# Patient Record
Sex: MALE | Race: WHITE | NOT HISPANIC OR LATINO | Employment: OTHER | URBAN - METROPOLITAN AREA
[De-identification: names, ages, dates, MRNs, and addresses within clinical notes are randomized per-mention and may not be internally consistent; named-entity substitution may affect disease eponyms.]

---

## 2017-02-16 DIAGNOSIS — I10 BENIGN ESSENTIAL HYPERTENSION: ICD-10-CM

## 2017-02-16 RX ORDER — HYDROCHLOROTHIAZIDE 25 MG/1
TABLET ORAL
Qty: 90 TABLET | Refills: 0 | OUTPATIENT
Start: 2017-02-16

## 2017-02-17 DIAGNOSIS — I10 BENIGN ESSENTIAL HYPERTENSION: ICD-10-CM

## 2017-02-17 RX ORDER — HYDROCHLOROTHIAZIDE 25 MG/1
TABLET ORAL
Qty: 90 TABLET | Refills: 0 | Status: SHIPPED | OUTPATIENT
Start: 2017-02-17 | End: 2017-05-20

## 2017-02-17 NOTE — TELEPHONE ENCOUNTER
hydrochlorothiazide (HYDRODIURIL) 25 MG tablet        Last Written Prescription Date: 6/6/2016  Last Fill Quantity: 90, # refills: 3  Last Office Visit with [facility], [facility] or [facility] prescribing provider: 6/6/2016       Potassium   Date Value Ref Range Status   07/07/2016 3.4 3.4 - 5.3 mmol/L Final     Creatinine   Date Value Ref Range Status   07/07/2016 1.25 0.66 - 1.25 mg/dL Final     BP Readings from Last 3 Encounters:   06/06/16 138/86   03/20/15 134/84   05/31/13 130/71

## 2017-02-17 NOTE — TELEPHONE ENCOUNTER
Prescription approved per [facility].  Jenny Acosta RN

## 2017-04-24 ENCOUNTER — TELEPHONE (OUTPATIENT)
Dept: FAMILY MEDICINE | Facility: CLINIC | Age: 52
End: 2017-04-24

## 2017-04-24 DIAGNOSIS — R10.9 FLANK PAIN: Primary | ICD-10-CM

## 2017-04-25 NOTE — TELEPHONE ENCOUNTER
Patient called re flank pain, off and on for 3 weeks, no f,c,s.  No abdomen pain or n/v, normal bm and urine.  Prior renal stones.    Will get ct and go from there    Hossein Farah M.D.

## 2017-04-27 ENCOUNTER — HOSPITAL ENCOUNTER (OUTPATIENT)
Dept: CT IMAGING | Facility: CLINIC | Age: 52
Discharge: HOME | End: 2017-04-27
Attending: INTERNAL MEDICINE | Admitting: INTERNAL MEDICINE
Payer: COMMERCIAL

## 2017-04-27 DIAGNOSIS — R10.9 FLANK PAIN: ICD-10-CM

## 2017-04-27 PROCEDURE — 74176 CT ABD & PELVIS W/O CONTRAST: CPT

## 2017-04-27 NOTE — LETTER
[facility]  [address] [facility]  [address]  [place]  [address]  Tel: [phone]    April 28, 2017    Allen Bills  [address] [place] [facility] [address]        Dear Loy Sanju,    The results of your recent CT is enclosed.    If you have any further questions or problems, please contact our office.      Sincerely,    Hossein Farah MD/maureen    Results for orders placed or performed during the hospital encounter of 04/27/17   CT Abdomen Pelvis w/o Contrast    Narrative    CT ABDOMEN AND PELVIS WITHOUT CONTRAST 4/27/2017 11:43 AM     HISTORY: Unspecified abdominal pain.    COMPARISON: None.    TECHNIQUE: Axial CT images of the abdomen and pelvis from the  diaphragm to the symphysis pubis were acquired without IV contrast.  Radiation dose for this scan was reduced using automated exposure  control, adjustment of the mA and/or kV according to patient size, or  iterative reconstruction technique.    FINDINGS: There are no stones seen within either kidney, either  ureter, or the bladder. There is no hydroureter or hydronephrosis.  There is no perinephric fat stranding. Kidneys are normal in size and  configuration. Normal caliber aorta. Unremarkable gallbladder.  Visualized portions of the appendix unremarkable. Liver unremarkable.  There are no dilated loops of small intestine or large bowel to  suggest ileus or obstruction. No free air or free fluid. No  splenomegaly. No definite adrenal nodules. Pancreas grossly  unremarkable. The remainder of the visualized abdomen is unremarkable  on this noncontrast scan. Survey of the visualized bony structures  demonstrates no destructive bony lesions. The visualized lung bases  are unremarkable.       Impression    IMPRESSION: No acute process demonstrated in the abdomen and pelvis.    FIDEL DE DIOS MD           Enclosure: Lab Results

## 2017-04-28 ENCOUNTER — TELEPHONE (OUTPATIENT)
Dept: FAMILY MEDICINE | Facility: CLINIC | Age: 52
End: 2017-04-28

## 2017-05-20 DIAGNOSIS — I10 BENIGN ESSENTIAL HYPERTENSION: ICD-10-CM

## 2017-05-20 NOTE — TELEPHONE ENCOUNTER
Pending Prescriptions:                       Disp   Refills    hydrochlorothiazide (HYDRODIURIL) 25 MG t*90 tab*0            Sig: TAKE ONE TABLET BY MOUTH ONE TIME DAILY    amLODIPine (NORVASC) 5 MG tablet [Pharmac*90 tab*3            Sig: TAKE 1 TABLET BY MOUTH DAILY    HCTZ      Last Written Prescription Date: 2/17/17  Last Fill Quantity: 90, # refills: 0  Last Office Visit with [facility] or [facility] prescribing provider: 6/6/16 [facility]       Potassium   Date Value Ref Range Status   07/07/2016 3.4 3.4 - 5.3 mmol/L Final     Creatinine   Date Value Ref Range Status   07/07/2016 1.25 0.66 - 1.25 mg/dL Final     BP Readings from Last 3 Encounters:   06/06/16 138/86   03/20/15 134/84   05/31/13 130/71     Amlodipine      Last Written Prescription Date: 6/6/16  Last Fill Quantity: 90, # refills: 3    Last Office Visit with [facility] or [facility] prescribing provider:  6/6/16 [facility]   Future Office Visit:        BP Readings from Last 3 Encounters:   06/06/16 138/86   03/20/15 134/84   05/31/13 130/71     Aury Paul RT(R)

## 2017-05-22 RX ORDER — HYDROCHLOROTHIAZIDE 25 MG/1
25 TABLET ORAL DAILY
Qty: 30 TABLET | Refills: 0 | Status: SHIPPED | OUTPATIENT
Start: 2017-05-22 | End: 2017-06-10

## 2017-05-22 RX ORDER — AMLODIPINE BESYLATE 5 MG/1
5 TABLET ORAL DAILY
Qty: 30 TABLET | Refills: 0 | Status: SHIPPED | OUTPATIENT
Start: 2017-05-22 | End: 2017-06-10

## 2017-05-22 NOTE — TELEPHONE ENCOUNTER
Prescription approved per [facility] Refill Protocol for 30 day supply  Patient will be due for physical come June 2016.    Sivan Mcdowell RN

## 2017-06-28 DIAGNOSIS — R56.9 CONVULSIONS, UNSPECIFIED CONVULSION TYPE (H): ICD-10-CM

## 2017-06-29 RX ORDER — LEVETIRACETAM 500 MG/1
TABLET ORAL
Qty: 180 TABLET | Refills: 0 | Status: SHIPPED | OUTPATIENT
Start: 2017-06-29 | End: 2017-08-01

## 2017-08-01 DIAGNOSIS — R56.9 CONVULSIONS, UNSPECIFIED CONVULSION TYPE (H): ICD-10-CM

## 2017-08-02 RX ORDER — LEVETIRACETAM 500 MG/1
TABLET ORAL
Qty: 180 TABLET | Refills: 0 | Status: SHIPPED | OUTPATIENT
Start: 2017-08-02 | End: 2017-08-15

## 2017-08-02 NOTE — TELEPHONE ENCOUNTER
levETIRAcetam (KEPPRA) 500 MG tablet        Last Written Prescription Date: 6/29/2017  Last Fill Quantity: 180,  # refills: 0   Last Office Visit with [facility], [facility] or [facility] prescribing provider: 6/6/2016                                         Next 5 appointments (look out 90 days)     Aug 15, 2017  3:30 PM CDT   Office Visit with Hossein Farah MD   [facility]    [address] [facility] [address]   [phone]

## 2017-08-15 ENCOUNTER — OFFICE VISIT (OUTPATIENT)
Dept: FAMILY MEDICINE | Facility: CLINIC | Age: 52
End: 2017-08-15
Payer: COMMERCIAL

## 2017-08-15 VITALS
SYSTOLIC BLOOD PRESSURE: 122 MMHG | OXYGEN SATURATION: 99 % | DIASTOLIC BLOOD PRESSURE: 70 MMHG | HEIGHT: 66 IN | BODY MASS INDEX: 32.3 KG/M2 | TEMPERATURE: 98.1 F | WEIGHT: 201 LBS | HEART RATE: 80 BPM

## 2017-08-15 DIAGNOSIS — Z00.00 ROUTINE GENERAL MEDICAL EXAMINATION AT A HEALTH CARE FACILITY: Primary | ICD-10-CM

## 2017-08-15 DIAGNOSIS — R56.9 CONVULSIONS, UNSPECIFIED CONVULSION TYPE (H): ICD-10-CM

## 2017-08-15 DIAGNOSIS — I10 BENIGN ESSENTIAL HYPERTENSION: ICD-10-CM

## 2017-08-15 DIAGNOSIS — R06.09 DOE (DYSPNEA ON EXERTION): ICD-10-CM

## 2017-08-15 DIAGNOSIS — R79.89 ELEVATED SERUM CREATININE: ICD-10-CM

## 2017-08-15 DIAGNOSIS — E78.5 HYPERLIPIDEMIA LDL GOAL <100: ICD-10-CM

## 2017-08-15 LAB
ALBUMIN UR-MCNC: NEGATIVE MG/DL
APPEARANCE UR: CLEAR
BASOPHILS # BLD AUTO: 0 10E9/L (ref 0–0.2)
BASOPHILS NFR BLD AUTO: 0.4 %
BILIRUB UR QL STRIP: NEGATIVE
COLOR UR AUTO: YELLOW
DIFFERENTIAL METHOD BLD: NORMAL
EOSINOPHIL # BLD AUTO: 0.1 10E9/L (ref 0–0.7)
EOSINOPHIL NFR BLD AUTO: 1.2 %
ERYTHROCYTE [DISTWIDTH] IN BLOOD BY AUTOMATED COUNT: 12.7 % (ref 10–15)
GLUCOSE UR STRIP-MCNC: NEGATIVE MG/DL
HCT VFR BLD AUTO: 43.1 % (ref 40–53)
HGB BLD-MCNC: 14.6 G/DL (ref 13.3–17.7)
HGB UR QL STRIP: ABNORMAL
KETONES UR STRIP-MCNC: NEGATIVE MG/DL
LEUKOCYTE ESTERASE UR QL STRIP: NEGATIVE
LYMPHOCYTES # BLD AUTO: 2.2 10E9/L (ref 0.8–5.3)
LYMPHOCYTES NFR BLD AUTO: 28.7 %
MCH RBC QN AUTO: 31.3 PG (ref 26.5–33)
MCHC RBC AUTO-ENTMCNC: 33.9 G/DL (ref 31.5–36.5)
MCV RBC AUTO: 93 FL (ref 78–100)
MONOCYTES # BLD AUTO: 0.4 10E9/L (ref 0–1.3)
MONOCYTES NFR BLD AUTO: 5.5 %
NEUTROPHILS # BLD AUTO: 4.8 10E9/L (ref 1.6–8.3)
NEUTROPHILS NFR BLD AUTO: 64.2 %
NITRATE UR QL: NEGATIVE
PH UR STRIP: 6 PH (ref 5–7)
PLATELET # BLD AUTO: 273 10E9/L (ref 150–450)
RBC # BLD AUTO: 4.66 10E12/L (ref 4.4–5.9)
RBC #/AREA URNS AUTO: NORMAL /HPF
SOURCE: ABNORMAL
SP GR UR STRIP: 1.02 (ref 1–1.03)
UROBILINOGEN UR STRIP-ACNC: 0.2 EU/DL (ref 0.2–1)
WBC # BLD AUTO: 7.5 10E9/L (ref 4–11)
WBC #/AREA URNS AUTO: NORMAL /HPF

## 2017-08-15 PROCEDURE — 99396 PREV VISIT EST AGE 40-64: CPT | Performed by: INTERNAL MEDICINE

## 2017-08-15 PROCEDURE — G0103 PSA SCREENING: HCPCS | Performed by: INTERNAL MEDICINE

## 2017-08-15 PROCEDURE — 81001 URINALYSIS AUTO W/SCOPE: CPT | Performed by: INTERNAL MEDICINE

## 2017-08-15 PROCEDURE — 80061 LIPID PANEL: CPT | Performed by: INTERNAL MEDICINE

## 2017-08-15 PROCEDURE — 85025 COMPLETE CBC W/AUTO DIFF WBC: CPT | Performed by: INTERNAL MEDICINE

## 2017-08-15 PROCEDURE — 86803 HEPATITIS C AB TEST: CPT | Performed by: INTERNAL MEDICINE

## 2017-08-15 PROCEDURE — 82550 ASSAY OF CK (CPK): CPT | Performed by: INTERNAL MEDICINE

## 2017-08-15 PROCEDURE — 80053 COMPREHEN METABOLIC PANEL: CPT | Performed by: INTERNAL MEDICINE

## 2017-08-15 PROCEDURE — 99000 SPECIMEN HANDLING OFFICE-LAB: CPT | Performed by: INTERNAL MEDICINE

## 2017-08-15 PROCEDURE — 80177 DRUG SCRN QUAN LEVETIRACETAM: CPT | Mod: 90 | Performed by: INTERNAL MEDICINE

## 2017-08-15 PROCEDURE — 36415 COLL VENOUS BLD VENIPUNCTURE: CPT | Performed by: INTERNAL MEDICINE

## 2017-08-15 RX ORDER — LEVETIRACETAM 500 MG/1
TABLET ORAL
Qty: 540 TABLET | Refills: 3 | Status: SHIPPED | OUTPATIENT
Start: 2017-08-15 | End: 2018-12-03

## 2017-08-15 RX ORDER — HYDROCHLOROTHIAZIDE 25 MG/1
TABLET ORAL
Qty: 90 TABLET | Refills: 3 | Status: SHIPPED | OUTPATIENT
Start: 2017-08-15 | End: 2017-09-11

## 2017-08-15 RX ORDER — AMLODIPINE BESYLATE 5 MG/1
TABLET ORAL
Qty: 90 TABLET | Refills: 3 | Status: SHIPPED | OUTPATIENT
Start: 2017-08-15 | End: 2017-09-11

## 2017-08-15 RX ORDER — SIMVASTATIN 20 MG
TABLET ORAL
Qty: 90 TABLET | Refills: 3 | Status: SHIPPED | OUTPATIENT
Start: 2017-08-15 | End: 2017-08-18

## 2017-08-15 NOTE — NURSING NOTE
"Chief Complaint   Patient presents with     Recheck Medication     Physical       Initial /70  Pulse 80  Temp 98.1  F (36.7  C) (Oral)  Ht 5' 5.5\" (1.664 m)  Wt 201 lb (91.2 kg)  SpO2 99%  BMI 32.94 kg/m2 Estimated body mass index is 32.94 kg/(m^2) as calculated from the following:    Height as of this encounter: 5' 5.5\" (1.664 m).    Weight as of this encounter: 201 lb (91.2 kg).  Medication Reconciliation: complete   Ashley BAKER CMA      "

## 2017-08-15 NOTE — LETTER
[facility]  [address] [facility]  [address]  [place]  [address]  Tel: [phone]    August 21, 2017    Allen Bills  [facility] [place] [address]        Dear Al,    As we discussed the labs look fine.  Your urine is normal, I am not worried about the trace blood in the urine with no blood seen under the microscope.     Your cbc or blood count is normal, no signs of anemia, leukemia or other nasty blood issues.    Your chemistry panel shows no diabetes.  Your blood salts, liver tests, hepatitis C test, psa, and proteins are all normal.  Your creatinine or kidney test is just slightly above normal, but has been for a long time.  I believe this is likely due to your large muscle mass and not due to a kidney issue.  Be sure not to take supplements which can hurt the kidneys.  Your kidneys looked fine on the April ct scan so no signs of problems there.  Given the stability over time I am not worried but we do want to keep checking it at least yearly.    Your total cholesterol is super at 131.  Your hdl or good cholesterol is very good at 56 and the ldl or bad is also super at 35.  I am not worried about the triglycerides, especially since you were not fasting.  I would continue the simvastatin and be sure to continue your diet and exercise and keep your weight down.    Your ck or muscle enzyme test is just above normal.  I am not worried about this and I suspect, as above, it is due to your muscles and weight lifting.  To be safe let's repeat this test in 6 months.  You do not need to see me for this but if you fast we can also repeat the lipids, only if you want.  Be sure not to lift weight's for at least 5 days before the test.    Regarding the Keppra, I would like you to see the neurologist, Stephen Rosales MD and see what he thinks about ?stopping it.    I believe your health is very good.  Remember to come back for the lab in 6 months, just call ahead.    If you have any questions please  call me.      Sincerely,    Hossein Farah MD/maureen    Results for orders placed or performed in visit on 08/15/17   CBC with platelets differential   Result Value Ref Range    WBC 7.5 4.0 - 11.0 10e9/L    RBC Count 4.66 4.4 - 5.9 10e12/L    Hemoglobin 14.6 13.3 - 17.7 g/dL    Hematocrit 43.1 40.0 - 53.0 %    MCV 93 78 - 100 fl    MCH 31.3 26.5 - 33.0 pg    MCHC 33.9 31.5 - 36.5 g/dL    RDW 12.7 10.0 - 15.0 %    Platelet Count 273 150 - 450 10e9/L    Diff Method Automated Method     % Neutrophils 64.2 %    % Lymphocytes 28.7 %    % Monocytes 5.5 %    % Eosinophils 1.2 %    % Basophils 0.4 %    Absolute Neutrophil 4.8 1.6 - 8.3 10e9/L    Absolute Lymphocytes 2.2 0.8 - 5.3 10e9/L    Absolute Monocytes 0.4 0.0 - 1.3 10e9/L    Absolute Eosinophils 0.1 0.0 - 0.7 10e9/L    Absolute Basophils 0.0 0.0 - 0.2 10e9/L   Comprehensive metabolic panel   Result Value Ref Range    Sodium 141 133 - 144 mmol/L    Potassium 3.4 3.4 - 5.3 mmol/L    Chloride 105 94 - 109 mmol/L    Carbon Dioxide 27 20 - 32 mmol/L    Anion Gap 9 3 - 14 mmol/L    Glucose 95 70 - 99 mg/dL    Urea Nitrogen 20 7 - 30 mg/dL    Creatinine 1.35 (H) 0.66 - 1.25 mg/dL    GFR Estimate 55 (L) >60 mL/min/1.7m2    GFR Estimate If Black 67 >60 mL/min/1.7m2    Calcium 9.1 8.5 - 10.1 mg/dL    Bilirubin Total 0.4 0.2 - 1.3 mg/dL    Albumin 4.1 3.4 - 5.0 g/dL    Protein Total 7.2 6.8 - 8.8 g/dL    Alkaline Phosphatase 61 40 - 150 U/L    ALT 48 0 - 70 U/L    AST 29 0 - 45 U/L   Lipid panel reflex to direct LDL   Result Value Ref Range    Cholesterol 131 <200 mg/dL    Triglycerides 199 (H) <150 mg/dL    HDL Cholesterol 56 >39 mg/dL    LDL Cholesterol Calculated 35 <100 mg/dL    Non HDL Cholesterol 75 <130 mg/dL   Hepatitis C Screen Reflex to HCV RNA Quant and Genotype   Result Value Ref Range    Hepatitis C Antibody Nonreactive NR^Nonreactive   Prostate spec antigen screen   Result Value Ref Range    PSA 1.04 0 - 4 ug/L   CK total   Result Value Ref Range    CK Total 349 (H)  30 - 300 U/L   Keppra (Levetiracetam) Level   Result Value Ref Range    Keppra (Levetiracetam) Level 38 12 - 46 ug/mL   *UA reflex to Microscopic   Result Value Ref Range    Color Urine Yellow     Appearance Urine Clear     Glucose Urine Negative NEG^Negative mg/dL    Bilirubin Urine Negative NEG^Negative    Ketones Urine Negative NEG^Negative mg/dL    Specific Gravity Urine 1.020 1.003 - 1.035    Blood Urine Trace (A) NEG^Negative    pH Urine 6.0 5.0 - 7.0 pH    Protein Albumin Urine Negative NEG^Negative mg/dL    Urobilinogen Urine 0.2 0.2 - 1.0 EU/dL    Nitrite Urine Negative NEG^Negative    Leukocyte Esterase Urine Negative NEG^Negative    Source Midstream Urine    Urine Microscopic   Result Value Ref Range    WBC Urine O - 2 OTO2^O - 2 /HPF    RBC Urine O - 2 OTO2^O - 2 /HPF           Enclosure: Lab Results

## 2017-08-15 NOTE — PROGRESS NOTES
Allen Bills is a 52 year old male who presents for cpx.  He is doing well overall, wife head of  for [employer] and they live [place] but also has condo here and he is back and forth a lot.  Has 3 daughters with one getting  this December.  He works out very reg and blood pressure good now, as noted I added norvasc last June for this.  He had left flank pain in April and ct abdomen and pelvis done and normal and since has resolved.  Had right low back pain but that also is now gone    He has had left greater then right shoulder pain for years, ok if not using it but hurts to use it and therefore weak.  Some on right.  Some knee pains but otherwise fine.  Last year some pains and ck high but 2nd fine.      He had dyspnea on exertion last year when starting work outs but mostly fine now, although prior pos ebct as noted.  Last est 2015.  Has family history father with ascvd and sudden death.      He is working out reg as noted, weight is down.  Up to date colon and tdap               Past Medical History:      Past Medical History:   Diagnosis Date     AVM (arteriovenous malformation) brain 2005    surgery [facility]     Elevated serum creatinine      Flank pain 04/2017    ct abd and pelvis nl     H/O colonoscopy 4/16    int hem otherwise nl     Hearing loss in left ear      HTN (hypertension) 2011    added norvasc 6/16     Nephrolithiasis 2005 and 2006    Dr. Chaparro     Normal echocardiogram 2006     Normal stress echocardiogram 2006, 2015    nl     Numbness 2006    mri neg     Palpitations 2011    est echo nl [place] [facility] 8/31/11     Screening 2011    ebct score 218, seen by Dr. Jimenez 2011     Seizure (H) 2006    right sided numbness, Dr. Rosales     Tinnitus of left ear 2005             Past Surgical History:      Past Surgical History:   Procedure Laterality Date     C BRAIN AVM SURG,DURAL,COMPLX  2005    [facility]     LITHOTRIPSY  2005             Social History:     Social History     Social History      Marital status:      Spouse name: N/A     Number of children: 3     Years of education: N/A     Occupational History     marketing and sales      Social History Main Topics     Smoking status: Never Smoker     Smokeless tobacco: Never Used     Alcohol use 0.0 oz/week     0 Standard drinks or equivalent per week      Comment: 2 drinks monthly     Drug use: No     Sexual activity: Yes     Partners: Female     Other Topics Concern     Not on file     Social History Narrative             Family History:   reviewed         Allergies:   No Known Allergies          Medications:     Current Outpatient Prescriptions   Medication Sig Dispense Refill     levETIRAcetam (KEPPRA) 500 MG tablet TAKE 3 TABS BY MOUTH TWO TIMES DAILY 540 tablet 3     amLODIPine (NORVASC) 5 MG tablet TAKE 1 TABLET (5 MG) BY MOUTH DAILY DUE FOR YEARLY PHYSICAL. CALL [phone] TO SCHEDULE. 90 tablet 3     hydrochlorothiazide (HYDRODIURIL) 25 MG tablet TAKE 1 TABLET BY MOUTH DAILY DUE FOR YEARLY PHYSICAL. CALL [phone] TO SCHEDULE. 90 tablet 3     simvastatin (ZOCOR) 20 MG tablet TAKE ONE TABLET BY MOUTH NIGHTLY AT BEDTIME 90 tablet 3     [DISCONTINUED] levETIRAcetam (KEPPRA) 500 MG tablet TAKE 3 TABS BY MOUTH TWO TIMES DAILY 180 tablet 0     [DISCONTINUED] amLODIPine (NORVASC) 5 MG tablet TAKE 1 TABLET (5 MG) BY MOUTH DAILY DUE FOR YEARLY PHYSICAL. CALL [phone] TO SCHEDULE. 90 tablet 0     [DISCONTINUED] hydrochlorothiazide (HYDRODIURIL) 25 MG tablet TAKE 1 TABLET BY MOUTH DAILY DUE FOR YEARLY PHYSICAL. CALL [phone] TO SCHEDULE. 90 tablet 0     [DISCONTINUED] simvastatin (ZOCOR) 20 MG tablet TAKE ONE TABLET BY MOUTH NIGHTLY AT BEDTIME 90 tablet 3     aspirin 81 MG tablet Take 1 tablet by mouth daily.       loratadine (CLARITIN) 10 MG capsule Take 10 mg by mouth daily.                 Review of Systems:   The 10 point Review of Systems is negative other than noted in the HPI           Physical Exam:   Blood pressure 122/70, pulse  "80, temperature 98.1  F (36.7  C), temperature source Oral, height 5' 5.5\" (1.664 m), weight 201 lb (91.2 kg), SpO2 99 %.    Exam:  Constitutional: healthy appearing, alert and in no distress  Heent: Normocephalic. Head without obvious masses or lesions. PERRLDC, EOMI. Mouth exam within normal limits: tongue, mucous membranes, posterior pharynx all normal, no lesions or abnormalities seen.  Tm's and canals within normal limits bilaterally. Neck supple, no nuchal rigidity or masses. No supraclavicular, or cervical adenopathy. Thyroid symmetric, no masses.  Cardiovascular: Regular rate and rhythm, no murmer, rub or gallops.  JVP not elevated, no edema.  Carotids within normal limits bilaterally, no bruits.  Respiratory: Normal respiratory effort.  Lungs clear, normal flow, no wheezing or crackles.  Breasts: Normal bilaterally.  No masses or lesions.  Nipples within normal limites.  No axillary lesions or nodes.  Gastrointestinal: Normal active bowel sounds.   Soft, not tender, no masses, guarding or rebound.  No hepatosplenomegaly.   Genitourinary: Rectal within normal limits.  Musculoskeletal: extremities normal, no gross deformities noted.  Skin: no suspicious lesions or rashes   Neurologic: Mental status within normal limits.  Speech fluent.  No gross motor abnormalities and gait intact.  Psychiatric: mentation appears normal and affect normal.         Data:   Labs sent, not fasting        Assessment:   1. Normal cpx  2. jt pains, suspect rot cuff, to Dr. Nirav marcos  3. Sz, no recurrence, on keppra  4. Hypertension, controlled  5. Elevated cholesterol on statin  6. Dyspnea on exertion, doubt significant, will check est to be safe, doubt pulmonary embolis, cxr clear 2015  7. hcm         Plan:   Up to date colon and immunizations  See ortho  Est echo  Letter with labs  Exercise, diet      Hossein Farah M.D.        "

## 2017-08-15 NOTE — MR AVS SNAPSHOT
After Visit Summary   8/15/2017    Allen Bills    MRN: [id number]           Patient Information     Date Of Birth          1965        Visit Information        Provider Department      8/15/2017 3:30 PM Hossein Farah MD [facility]        Today's Diagnoses     Routine general medical examination at a health care facility    -  1    Convulsions, unspecified convulsion type (H)        Benign essential hypertension        Hyperlipidemia LDL goal <100        Elevated serum creatinine        MACIEL (dyspnea on exertion)          Care Instructions      Preventive Health Recommendations  Male Ages 50 - 64    Yearly exam:             See your health care provider every year in order to  o   Review health changes.   o   Discuss preventive care.    o   Review your medicines if your doctor has prescribed any.     Have a cholesterol test every 5 years, or more frequently if you are at risk for high cholesterol/heart disease.     Have a diabetes test (fasting glucose) every three years. If you are at risk for diabetes, you should have this test more often.     Have a colonoscopy at age 50, or have a yearly FIT test (stool test). These exams will check for colon cancer.      Talk with your health care provider about whether or not a prostate cancer screening test (PSA) is right for you.    You should be tested each year for STDs (sexually transmitted diseases), if you re at risk.     Shots: Get a flu shot each year. Get a tetanus shot every 10 years.     Nutrition:    Eat at least 5 servings of fruits and vegetables daily.     Eat whole-grain bread, whole-wheat pasta and brown rice instead of white grains and rice.     Talk to your provider about Calcium and Vitamin D.     Lifestyle    Exercise for at least 150 minutes a week (30 minutes a day, 5 days a week). This will help you control your weight and prevent disease.     Limit alcohol to one drink per day.     No smoking.     Wear  "sunscreen to prevent skin cancer.     See your dentist every six months for an exam and cleaning.     See your eye doctor every 1 to 2 years.            Follow-ups after your visit        Future tests that were ordered for you today     Open Future Orders        Priority Expected Expires Ordered    Exercise Stress Echocardiogram Routine  8/15/2018 8/15/2017            Who to contact     If you have questions or need follow up information about today's clinic visit or your schedule please contact [facility] directly at [phone].  Normal or non-critical lab and imaging results will be communicated to you by Next audiencehart, letter or phone within 4 business days after the clinic has received the results. If you do not hear from us within 7 days, please contact the clinic through Next audiencehart or phone. If you have a critical or abnormal lab result, we will notify you by phone as soon as possible.  Submit refill requests through J-Kan or call your pharmacy and they will forward the refill request to us. Please allow 3 business days for your refill to be completed.          Additional Information About Your Visit        Next audienceharMeal Sharing Information     J-Kan lets you send messages to your doctor, view your test results, renew your prescriptions, schedule appointments and more. To sign up, go to [email or web address] . Click on \"Log in\" on the left side of the screen, which will take you to the Welcome page. Then click on \"Sign up Now\" on the right side of the page.     You will be asked to enter the access code listed below, as well as some personal information. Please follow the directions to create your username and password.     Your access code is: [email or web address]  Expires: 2017  3:52 PM     Your access code will  in 90 days. If you need help or a new code, please call your [facility] or [phone].        Care EveryWhere ID     This is your Care EveryWhere ID. This could be used by other " "organizations to access your [facility] medical records  [id number]        Your Vitals Were     Pulse Temperature Height Pulse Oximetry BMI (Body Mass Index)       80 98.1  F (36.7  C) (Oral) 5' 5.5\" (1.664 m) 99% 32.94 kg/m2        Blood Pressure from Last 3 Encounters:   08/15/17 122/70   06/06/16 138/86   03/20/15 134/84    Weight from Last 3 Encounters:   08/15/17 201 lb (91.2 kg)   06/06/16 226 lb 14.4 oz (102.9 kg)   03/20/15 218 lb (98.9 kg)              We Performed the Following     *UA reflex to Microscopic     CBC with platelets differential     CK total     Comprehensive metabolic panel     Hepatitis C Screen Reflex to HCV RNA Quant and Genotype     Keppra (Levetiracetam) Level     Lipid panel reflex to direct LDL     Prostate spec antigen screen          Today's Medication Changes          These changes are accurate as of: 8/15/17  3:52 PM.  If you have any questions, ask your nurse or doctor.               These medicines have changed or have updated prescriptions.        Dose/Directions    amLODIPine 5 MG tablet   Commonly known as:  NORVASC   This may have changed:  See the new instructions.   Used for:  Benign essential hypertension   Changed by:  Hossein Farah MD        TAKE 1 TABLET (5 MG) BY MOUTH DAILY DUE FOR YEARLY PHYSICAL. CALL [phone] TO SCHEDULE.   Quantity:  90 tablet   Refills:  3       hydrochlorothiazide 25 MG tablet   Commonly known as:  HYDRODIURIL   This may have changed:  See the new instructions.   Used for:  Benign essential hypertension   Changed by:  Hossein Farah MD        TAKE 1 TABLET BY MOUTH DAILY DUE FOR YEARLY PHYSICAL. CALL [phone] TO SCHEDULE.   Quantity:  90 tablet   Refills:  3       levETIRAcetam 500 MG tablet   Commonly known as:  KEPPRA   This may have changed:  See the new instructions.   Used for:  Convulsions, unspecified convulsion type (H)   Changed by:  Hossein Farah MD        TAKE 3 TABS BY MOUTH TWO TIMES DAILY   Quantity: "  540 tablet   Refills:  3       simvastatin 20 MG tablet   Commonly known as:  ZOCOR   This may have changed:  See the new instructions.   Used for:  Hyperlipidemia LDL goal <100   Changed by:  Hossein Farah MD        TAKE ONE TABLET BY MOUTH NIGHTLY AT BEDTIME   Quantity:  90 tablet   Refills:  3            Where to get your medicines      These medications were sent to [facility]/pharmacy #[id number] - [place], [place] - [address] AT [address], [facility] [address]     Phone:  [phone]     amLODIPine 5 MG tablet    hydrochlorothiazide 25 MG tablet    levETIRAcetam 500 MG tablet    simvastatin 20 MG tablet                Primary Care Provider Office Phone # Fax #    Hossein Farah [phone] [address] [address]        Equal Access to Services     [facility]: Hadii lalo ku hadasho Soomaali, waaxda luqadaha, qaybta kaalmada adeegyada, yolanda tolliverin hayaan xiomara lopez . So [facility] [phone].    ATENCIÓN: Si habla español, tiene a hernandez disposición servicios gratuitos de asistencia lingüística. Ll[facility] [phone].    We comply with applicable federal civil rights laws and [place] laws. We do not discriminate on the basis of race, color, national origin, age, disability sex, sexual orientation or gender identity.            Thank you!     Thank you for choosing [facility]  for your care. Our goal is always to provide you with excellent care. Hearing back from our patients is one way we can continue to improve our services. Please take a few minutes to complete the written survey that you may receive in the mail after your visit with us. Thank you!             Your Updated Medication List - Protect others around you: Learn how to safely use, store and throw away your medicines at www.disposemymeds.org.          This list is accurate as of: 8/15/17  3:52 PM.  Always use your most recent med list.                    Brand Name Dispense Instructions for use Diagnosis    amLODIPine 5 MG tablet    NORVASC    90 tablet    TAKE 1 TABLET (5 MG) BY MOUTH DAILY DUE FOR YEARLY PHYSICAL. CALL [phone] TO SCHEDULE.    Benign essential hypertension       aspirin 81 MG tablet      Take 1 tablet by mouth daily.        CLARITIN 10 MG capsule   Generic drug:  loratadine      Take 10 mg by mouth daily.        hydrochlorothiazide 25 MG tablet    HYDRODIURIL    90 tablet    TAKE 1 TABLET BY MOUTH DAILY DUE FOR YEARLY PHYSICAL. CALL [phone] TO SCHEDULE.    Benign essential hypertension       levETIRAcetam 500 MG tablet    KEPPRA    540 tablet    TAKE 3 TABS BY MOUTH TWO TIMES DAILY    Convulsions, unspecified convulsion type (H)       simvastatin 20 MG tablet    ZOCOR    90 tablet    TAKE ONE TABLET BY MOUTH NIGHTLY AT BEDTIME    Hyperlipidemia LDL goal <100

## 2017-08-16 LAB
ALBUMIN SERPL-MCNC: 4.1 G/DL (ref 3.4–5)
ALP SERPL-CCNC: 61 U/L (ref 40–150)
ALT SERPL W P-5'-P-CCNC: 48 U/L (ref 0–70)
ANION GAP SERPL CALCULATED.3IONS-SCNC: 9 MMOL/L (ref 3–14)
AST SERPL W P-5'-P-CCNC: 29 U/L (ref 0–45)
BILIRUB SERPL-MCNC: 0.4 MG/DL (ref 0.2–1.3)
BUN SERPL-MCNC: 20 MG/DL (ref 7–30)
CALCIUM SERPL-MCNC: 9.1 MG/DL (ref 8.5–10.1)
CHLORIDE SERPL-SCNC: 105 MMOL/L (ref 94–109)
CHOLEST SERPL-MCNC: 131 MG/DL
CK SERPL-CCNC: 349 U/L (ref 30–300)
CO2 SERPL-SCNC: 27 MMOL/L (ref 20–32)
CREAT SERPL-MCNC: 1.35 MG/DL (ref 0.66–1.25)
GFR SERPL CREATININE-BSD FRML MDRD: 55 ML/MIN/1.7M2
GLUCOSE SERPL-MCNC: 95 MG/DL (ref 70–99)
HCV AB SERPL QL IA: NONREACTIVE
HDLC SERPL-MCNC: 56 MG/DL
LDLC SERPL CALC-MCNC: 35 MG/DL
NONHDLC SERPL-MCNC: 75 MG/DL
POTASSIUM SERPL-SCNC: 3.4 MMOL/L (ref 3.4–5.3)
PROT SERPL-MCNC: 7.2 G/DL (ref 6.8–8.8)
PSA SERPL-ACNC: 1.04 UG/L (ref 0–4)
SODIUM SERPL-SCNC: 141 MMOL/L (ref 133–144)
TRIGL SERPL-MCNC: 199 MG/DL

## 2017-08-18 DIAGNOSIS — E78.5 HYPERLIPIDEMIA LDL GOAL <100: ICD-10-CM

## 2017-08-18 LAB — LEVETIRACETAM SERPL-MCNC: 38 UG/ML (ref 12–46)

## 2017-08-18 RX ORDER — SIMVASTATIN 20 MG
TABLET ORAL
Qty: 90 TABLET | Refills: 3 | Status: SHIPPED | OUTPATIENT
Start: 2017-08-18 | End: 2018-08-24

## 2017-08-18 NOTE — TELEPHONE ENCOUNTER
[place] state    simvastatin (ZOCOR) 20 MG tablet       Last Written Prescription Date: 8/15/2017  Last Fill Quantity: 90, # refills: 3  Last Office Visit with [facility], P or [facility] prescribing provider: 8/15/2017       Lab Results   Component Value Date    CHOL 131 08/15/2017     Lab Results   Component Value Date    HDL 56 08/15/2017     Lab Results   Component Value Date    LDL 35 08/15/2017     Lab Results   Component Value Date    TRIG 199 08/15/2017     Lab Results   Component Value Date    CHOLHDLRATIO 2.6 03/20/2015

## 2017-08-18 NOTE — TELEPHONE ENCOUNTER
Change in pharmacy--now requesting that script [facility] [place]   Last Simvastatin was sent to [facility]    Prescription approved per [facility] Refill Protocol.    Sivan Mcdowell RN

## 2017-08-20 NOTE — PROGRESS NOTES
Al,    As we discussed the labs look fine.  Your urine is normal, I am not worried about the trace blood in the urine with no blood seen under the microscope.     Your cbc or blood count is normal, no signs of anemia, leukemia or other nasty blood issues.    Your chemistry panel shows no diabetes.  Your blood salts, liver tests, hepatitis C test, psa, and proteins are all normal.  Your creatinine or kidney test is just slightly above normal, but has been for a long time.  I believe this is likely due to your large muscle mass and not due to a kidney issue.  Be sure not to take supplements which can hurt the kidneys.  Your kidneys looked fine on the April ct scan so no signs of problems there.  Given the stability over time I am not worried but we do want to keep checking it at least yearly.    Your total cholesterol is super at 131.  Your hdl or good cholesterol is very good at 56 and the ldl or bad is also super at 35.  I am not worried about the triglycerides, especially since you were not fasting.  I would continue the simvastatin and be sure to continue your diet and exercise and keep your weight down.    Your ck or muscle enzyme test is just above normal.  I am not worried about this and I suspect, as above, it is due to your muscles and weight lifting.  To be safe let's repeat this test in 6 months.  You do not need to see me for this but if you fast we can also repeat the lipids, only if you want.  Be sure not to lift weight's for at least 5 days before the test.    Regarding the Keppra, I would like you to see the neurologist, Stephen Rosales MD and see what he thinks about ?stopping it.    I believe your health is very good.  Remember to come back for the lab in 6 months, just call ahead.    If you have any questions please call me.

## 2017-08-30 ENCOUNTER — HOSPITAL ENCOUNTER (OUTPATIENT)
Dept: CARDIOLOGY | Facility: CLINIC | Age: 52
Discharge: HOME | End: 2017-08-30
Attending: INTERNAL MEDICINE | Admitting: INTERNAL MEDICINE
Payer: COMMERCIAL

## 2017-08-30 DIAGNOSIS — R06.09 DOE (DYSPNEA ON EXERTION): ICD-10-CM

## 2017-08-30 PROCEDURE — 25500064 ZZH RX 255 OP 636: Performed by: INTERNAL MEDICINE

## 2017-08-30 PROCEDURE — 93325 DOPPLER ECHO COLOR FLOW MAPG: CPT | Mod: 26 | Performed by: INTERNAL MEDICINE

## 2017-08-30 PROCEDURE — 93350 STRESS TTE ONLY: CPT | Mod: 26 | Performed by: INTERNAL MEDICINE

## 2017-08-30 PROCEDURE — 93016 CV STRESS TEST SUPVJ ONLY: CPT | Performed by: INTERNAL MEDICINE

## 2017-08-30 PROCEDURE — 40000264 ECHO STRESS TEST WITH LUMASON

## 2017-08-30 PROCEDURE — 93321 DOPPLER ECHO F-UP/LMTD STD: CPT | Mod: 26 | Performed by: INTERNAL MEDICINE

## 2017-08-30 PROCEDURE — 93018 CV STRESS TEST I&R ONLY: CPT | Performed by: INTERNAL MEDICINE

## 2017-08-30 RX ADMIN — SULFUR HEXAFLUORIDE 4 ML: KIT at 13:30

## 2017-08-31 ENCOUNTER — TELEPHONE (OUTPATIENT)
Dept: FAMILY MEDICINE | Facility: CLINIC | Age: 52
End: 2017-08-31

## 2017-09-09 DIAGNOSIS — I10 BENIGN ESSENTIAL HYPERTENSION: ICD-10-CM

## 2017-09-11 RX ORDER — AMLODIPINE BESYLATE 5 MG/1
5 TABLET ORAL DAILY
Qty: 90 TABLET | Refills: 3 | Status: SHIPPED | OUTPATIENT
Start: 2017-09-11 | End: 2018-06-25

## 2017-09-11 RX ORDER — HYDROCHLOROTHIAZIDE 25 MG/1
25 TABLET ORAL DAILY
Qty: 90 TABLET | Refills: 3 | Status: SHIPPED | OUTPATIENT
Start: 2017-09-11 | End: 2018-11-29

## 2017-09-11 NOTE — TELEPHONE ENCOUNTER
Change in pharmacy.  Medications approved per [facility] protocol    Sivna Mcdowell RN

## 2017-10-20 ENCOUNTER — TRANSFERRED RECORDS (OUTPATIENT)
Dept: HEALTH INFORMATION MANAGEMENT | Facility: CLINIC | Age: 52
End: 2017-10-20

## 2017-11-30 ENCOUNTER — TELEPHONE (OUTPATIENT)
Dept: FAMILY MEDICINE | Facility: CLINIC | Age: 52
End: 2017-11-30

## 2017-11-30 DIAGNOSIS — T75.89XA EFFECTS OF EXPOSURE TO EXTERNAL CAUSE, INITIAL ENCOUNTER: Primary | ICD-10-CM

## 2017-11-30 DIAGNOSIS — T75.89XA EFFECTS OF EXPOSURE TO EXTERNAL CAUSE, INITIAL ENCOUNTER: ICD-10-CM

## 2017-11-30 RX ORDER — DOXYCYCLINE 100 MG/1
CAPSULE ORAL
Qty: 6 CAPSULE | Refills: 0 | Status: SHIPPED | OUTPATIENT
Start: 2017-11-30 | End: 2017-11-30

## 2017-11-30 RX ORDER — DOXYCYCLINE 100 MG/1
CAPSULE ORAL
Qty: 8 CAPSULE | Refills: 0 | Status: SHIPPED | OUTPATIENT
Start: 2017-11-30 | End: 2018-06-25

## 2017-12-01 NOTE — TELEPHONE ENCOUNTER
Patient called re possible exposure to leptospira, dog has it.  He is not ill.  Will treat with doxy 200mg weekly, patient to call if ill    Hossein Farah M.D.

## 2017-12-20 ENCOUNTER — TELEPHONE (OUTPATIENT)
Dept: FAMILY MEDICINE | Facility: CLINIC | Age: 52
End: 2017-12-20

## 2017-12-20 DIAGNOSIS — B02.9 HERPES ZOSTER WITHOUT COMPLICATION: Primary | ICD-10-CM

## 2017-12-20 RX ORDER — VALACYCLOVIR HYDROCHLORIDE 1 G/1
1000 TABLET, FILM COATED ORAL 3 TIMES DAILY
Qty: 21 TABLET | Refills: 0 | Status: SHIPPED | OUTPATIENT
Start: 2017-12-20 | End: 2018-06-25

## 2017-12-20 NOTE — TELEPHONE ENCOUNTER
Patient called with rash right inner thigh and also t/n right thigh.  Sent me photo and appears to be shingles.  Offered office visit tomorrow, he would rather treat.  Will send in prescription, call if worsens, not gone in next 2 weeks    Hosseni Farah M.D.

## 2018-06-25 ENCOUNTER — OFFICE VISIT (OUTPATIENT)
Dept: FAMILY MEDICINE | Facility: CLINIC | Age: 53
End: 2018-06-25
Payer: COMMERCIAL

## 2018-06-25 VITALS
TEMPERATURE: 97.6 F | DIASTOLIC BLOOD PRESSURE: 65 MMHG | OXYGEN SATURATION: 99 % | BODY MASS INDEX: 32.37 KG/M2 | HEART RATE: 63 BPM | HEIGHT: 66 IN | WEIGHT: 201.4 LBS | SYSTOLIC BLOOD PRESSURE: 127 MMHG

## 2018-06-25 DIAGNOSIS — I10 BENIGN ESSENTIAL HYPERTENSION: ICD-10-CM

## 2018-06-25 DIAGNOSIS — R56.9 SEIZURE (H): Primary | ICD-10-CM

## 2018-06-25 DIAGNOSIS — R79.89 ELEVATED SERUM CREATININE: ICD-10-CM

## 2018-06-25 DIAGNOSIS — E78.5 HYPERLIPIDEMIA LDL GOAL <100: ICD-10-CM

## 2018-06-25 PROCEDURE — 84443 ASSAY THYROID STIM HORMONE: CPT | Performed by: INTERNAL MEDICINE

## 2018-06-25 PROCEDURE — 99213 OFFICE O/P EST LOW 20 MIN: CPT | Performed by: INTERNAL MEDICINE

## 2018-06-25 PROCEDURE — 80048 BASIC METABOLIC PNL TOTAL CA: CPT | Performed by: INTERNAL MEDICINE

## 2018-06-25 PROCEDURE — 36415 COLL VENOUS BLD VENIPUNCTURE: CPT | Performed by: INTERNAL MEDICINE

## 2018-06-25 RX ORDER — AMLODIPINE BESYLATE 5 MG/1
10 TABLET ORAL DAILY
Qty: 90 TABLET | Refills: 3 | COMMUNITY
End: 2018-06-25

## 2018-06-25 RX ORDER — AMLODIPINE BESYLATE 5 MG/1
5 TABLET ORAL 2 TIMES DAILY
Qty: 180 TABLET | Refills: 3 | Status: SHIPPED | OUTPATIENT
Start: 2018-06-25 | End: 2018-12-03

## 2018-06-25 NOTE — PATIENT INSTRUCTIONS
Take 5mg of amlodipine in the morning and in the evening, continue all your other medicines.  Do the blood pressure monitor     Hossein Farah M.D.

## 2018-06-25 NOTE — PROGRESS NOTES
The patient is here to follow-up his multiple medical problems.      The patient recently called me regarding his blood pressure being a bit high so I increased his amlodipine to 10 mg a day.  With this his blood pressure is down a bit.  He is very active and works out regularly.  He has minimal caffeine and alcohol.  He does not smoke.  His weight is down from last year.  He takes his medications regularly.    The patient has a history of an elevated creatinine and will get follow-up on this today.  He also has hyperlipidemia and takes his medication regularly.  He has had prior seizures but no recent issues on medication.    The patient for at least a couple years notes that when he starts to workout he can feel a bit short of breath but then after starting he is fine.  He works out very regularly and can walk several miles a day without any chest pain or shortness of breath.  He no longer has palpitations.  He did have a positive coronary CT scan in the past and saw cardiology but no recommendations other than risk factor modification and periodic stress tests were done.  His last stress test was less than a year ago and that was negative.    Past Medical History:   Diagnosis Date     AVM (arteriovenous malformation) brain 2005    surgery [facility]     Elevated serum creatinine      Flank pain 04/2017    ct abd and pelvis nl     H/O colonoscopy 4/16    int hem otherwise nl     Hearing loss in left ear      HTN (hypertension) 2011    added norvasc 6/16     Nephrolithiasis 2005 and 2006    Dr. Chaparro     Normal echocardiogram 2006     Normal stress echocardiogram 2006, 2015, 8/17    nl     Numbness 2006    mri neg     Palpitations 2011    est echo nl [place] [facility] 8/31/11     Screening 2011    ebct score 218, seen by Dr. Jimenez 2011     Seizure (H) 2006    right sided numbness, Dr. Rosales     Tinnitus of left ear 2005     Past Surgical History:   Procedure Laterality Date     C BRAIN AVM SURG,DURAL,COMPLX  2005     "[place]     LITHOTRIPSY  2005     Social History     Social History     Marital status:      Spouse name: N/A     Number of children: 3     Years of education: N/A     Occupational History     marketing and sales      Social History Main Topics     Smoking status: Never Smoker     Smokeless tobacco: Never Used     Alcohol use 0.0 oz/week     0 Standard drinks or equivalent per week      Comment: 2 drinks monthly     Drug use: No     Sexual activity: Yes     Partners: Female     Other Topics Concern     Not on file     Social History Narrative     Current Outpatient Prescriptions   Medication Sig Dispense Refill     amLODIPine (NORVASC) 5 MG tablet Take 1 tablet (5 mg) by mouth 2 times daily 180 tablet 3     aspirin 81 MG tablet Take 1 tablet by mouth daily.       hydrochlorothiazide (HYDRODIURIL) 25 MG tablet Take 1 tablet (25 mg) by mouth daily 90 tablet 3     levETIRAcetam (KEPPRA) 500 MG tablet TAKE 3 TABS BY MOUTH TWO TIMES DAILY 540 tablet 3     loratadine (CLARITIN) 10 MG capsule Take 10 mg by mouth daily.       simvastatin (ZOCOR) 20 MG tablet TAKE ONE TABLET BY MOUTH NIGHTLY AT BEDTIME 90 tablet 3     [DISCONTINUED] amLODIPine (NORVASC) 5 MG tablet Take 2 tablets (10 mg) by mouth daily 90 tablet 3     [DISCONTINUED] amLODIPine (NORVASC) 5 MG tablet Take 1 tablet (5 mg) by mouth daily 90 tablet 3     No Known Allergies  FAMILY HISTORY NOTED AND REVIEWED    REVIEW OF SYSTEMS: above    PHYSICAL EXAM    /65 (BP Location: Left arm, Patient Position: Chair, Cuff Size: Adult Large)  Pulse 63  Temp 97.6  F (36.4  C) (Oral)  Ht 5' 5.5\" (1.664 m)  Wt 201 lb 6.4 oz (91.4 kg)  SpO2 99%  BMI 33 kg/m2    Patient appears non toxic  His blood pressure machine reads 8 points higher then ours  Lungs - clear, normal flow  Cardiovascular - regular rate and rhythm, no murmer, rub or gallop, no jvp or edema, carotids within normal limits, no bruits.  Abdomen - normal active bowel sounds, soft, non tender, no " masses, guarding or rebound, no hepatosplenomegaly    Labs sent    ASSESSMENT:  1. Hypertension, control seems fine but will check 24 hour amb monitor  2., sz, no issue  3. Elevated cholesterol on statin  4. Elevated creat, follow up labs  5. Shortness of breath, doubt significant, could consider cards but not new or changed    PLAN:  24 hour blood pressure monitor    Hossein Farah M.D.        Hossein Farah M.D.

## 2018-06-25 NOTE — LETTER
[facility]  [address] [facility]  [address]  [place]  [address]  Tel: [phone]    June 26, 2018    Allen Bills  [address] [place] [address]        Dear Mr. Bills,    Enclosed are the labs for your review.  They look very good.  This includes your thyroid test, sugar test for diabetes, and kidney test.  Your potassium level was a bit low so please have one banana daily.  When you come back for the 24 hour blood pressure test please come to my office to do the potassium lab.  You do not need to fast or see me for this but just call before you come.     If you have any further questions or problems, please contact our office.      Sincerely,    Hossein Farah MD/ Bekah Toledo CMA  Results for orders placed or performed in visit on 06/25/18   TSH with free T4 reflex   Result Value Ref Range    TSH 1.72 0.40 - 4.00 mU/L   Basic metabolic panel   Result Value Ref Range    Sodium 139 133 - 144 mmol/L    Potassium 3.3 (L) 3.4 - 5.3 mmol/L    Chloride 102 94 - 109 mmol/L    Carbon Dioxide 25 20 - 32 mmol/L    Anion Gap 12 3 - 14 mmol/L    Glucose 79 70 - 99 mg/dL    Urea Nitrogen 22 7 - 30 mg/dL    Creatinine 1.20 0.66 - 1.25 mg/dL    GFR Estimate 63 >60 mL/min/1.7m2    GFR Estimate If Black 76 >60 mL/min/1.7m2    Calcium 9.2 8.5 - 10.1 mg/dL               Enclosure: Lab Results

## 2018-06-25 NOTE — MR AVS SNAPSHOT
"              After Visit Summary   6/25/2018    Allen Bills    MRN: [id number]           Patient Information     Date Of Birth          1965        Visit Information        Provider Department      6/25/2018 1:30 PM Hossein Farah MD [facility]        Today's Diagnoses     Seizure (H)    -  1    Benign essential hypertension        Hyperlipidemia LDL goal <100        Elevated serum creatinine          Care Instructions    Take 5mg of amlodipine in the morning and in the evening, continue all your other medicines.  Do the blood pressure monitor     Hossein Farah M.D.            Follow-ups after your visit        Future tests that were ordered for you today     Open Future Orders        Priority Expected Expires Ordered    24 Hour Blood Pressure Monitor - Adult Routine  8/9/2018 6/25/2018            Who to contact     If you have questions or need follow up information about today's clinic visit or your schedule please contact [facility] directly at [phone].  Normal or non-critical lab and imaging results will be communicated to you by MyChart, letter or phone within 4 business days after the clinic has received the results. If you do not hear from us within 7 days, please contact the clinic through MyChart or phone. If you have a critical or abnormal lab result, we will notify you by phone as soon as possible.  Submit refill requests through Harimata or call your pharmacy and they will forward the refill request to us. Please allow 3 business days for your refill to be completed.          Additional Information About Your Visit        Care EveryWhere ID     This is your Care EveryWhere ID. This could be used by other organizations to access your [facility] medical records  [id number]        Your Vitals Were     Pulse Temperature Height Pulse Oximetry BMI (Body Mass Index)       63 97.6  F (36.4  C) (Oral) 5' 5.5\" (1.664 m) 99% 33 kg/m2        Blood Pressure from Last 3 " Encounters:   06/25/18 127/65   08/15/17 122/70   06/06/16 138/86    Weight from Last 3 Encounters:   06/25/18 201 lb 6.4 oz (91.4 kg)   08/15/17 201 lb (91.2 kg)   06/06/16 226 lb 14.4 oz (102.9 kg)              We Performed the Following     Basic metabolic panel     TSH with free T4 reflex          Today's Medication Changes          These changes are accurate as of 6/25/18  1:56 PM.  If you have any questions, ask your nurse or doctor.               Start taking these medicines.        Dose/Directions    amLODIPine 5 MG tablet   Commonly known as:  NORVASC   Used for:  Benign essential hypertension   Started by:  Hossein Farah MD        Dose:  5 mg   Take 1 tablet (5 mg) by mouth 2 times daily   Quantity:  180 tablet   Refills:  3         Stop taking these medicines if you haven't already. Please contact your care team if you have questions.     doxycycline 100 MG capsule   Commonly known as:  VIBRAMYCIN   Stopped by:  Hossein Farah MD           valACYclovir 1000 mg tablet   Commonly known as:  VALTREX   Stopped by:  Hossein Farah MD                Where to get your medicines      These medications were sent to [facility] [address] [facility] [place], [place] - [address] [facility]  [address], [place] [address]     Phone:  [phone]     amLODIPine 5 MG tablet                Primary Care Provider Office Phone # Fax #    Hossein Farah [phone] [address]  [facility] [address]        Equal Access to Services     [facility]: Hadii aad ku hadasho Soomaali, waaxda luqadaha, qaybta kaalmada adeegyada, waxay carrie patino. So [facility] [phone].    ATENCIÓN: Si habla español, tiene a hernandez disposición servicios gratuitos de asistencia lingüística. Llame al [phone].    We comply with applicable federal civil rights laws and [place] laws. We do not discriminate on the basis of race, color, national origin, age, disability, sex,  sexual orientation, or gender identity.            Thank you!     Thank you for choosing [facility]  for your care. Our goal is always to provide you with excellent care. Hearing back from our patients is one way we can continue to improve our services. Please take a few minutes to complete the written survey that you may receive in the mail after your visit with us. Thank you!             Your Updated Medication List - Protect others around you: Learn how to safely use, store and throw away your medicines at www.disposemymeds.org.          This list is accurate as of 6/25/18  1:56 PM.  Always use your most recent med list.                   Brand Name Dispense Instructions for use Diagnosis    amLODIPine 5 MG tablet    NORVASC    180 tablet    Take 1 tablet (5 mg) by mouth 2 times daily    Benign essential hypertension       aspirin 81 MG tablet      Take 1 tablet by mouth daily.        CLARITIN 10 MG capsule   Generic drug:  loratadine      Take 10 mg by mouth daily.        hydrochlorothiazide 25 MG tablet    HYDRODIURIL    90 tablet    Take 1 tablet (25 mg) by mouth daily    Benign essential hypertension       levETIRAcetam 500 MG tablet    KEPPRA    540 tablet    TAKE 3 TABS BY MOUTH TWO TIMES DAILY    Convulsions, unspecified convulsion type (H)       simvastatin 20 MG tablet    ZOCOR    90 tablet    TAKE ONE TABLET BY MOUTH NIGHTLY AT BEDTIME    Hyperlipidemia LDL goal <100

## 2018-06-26 DIAGNOSIS — I10 BENIGN ESSENTIAL HYPERTENSION: Primary | ICD-10-CM

## 2018-06-26 DIAGNOSIS — N20.0 NEPHROLITHIASIS: ICD-10-CM

## 2018-06-26 LAB
ANION GAP SERPL CALCULATED.3IONS-SCNC: 12 MMOL/L (ref 3–14)
BUN SERPL-MCNC: 22 MG/DL (ref 7–30)
CALCIUM SERPL-MCNC: 9.2 MG/DL (ref 8.5–10.1)
CHLORIDE SERPL-SCNC: 102 MMOL/L (ref 94–109)
CO2 SERPL-SCNC: 25 MMOL/L (ref 20–32)
CREAT SERPL-MCNC: 1.2 MG/DL (ref 0.66–1.25)
GFR SERPL CREATININE-BSD FRML MDRD: 63 ML/MIN/1.7M2
GLUCOSE SERPL-MCNC: 79 MG/DL (ref 70–99)
POTASSIUM SERPL-SCNC: 3.3 MMOL/L (ref 3.4–5.3)
SODIUM SERPL-SCNC: 139 MMOL/L (ref 133–144)
TSH SERPL DL<=0.005 MIU/L-ACNC: 1.72 MU/L (ref 0.4–4)

## 2018-06-28 DIAGNOSIS — R06.02 SOB (SHORTNESS OF BREATH): Primary | ICD-10-CM

## 2018-06-28 DIAGNOSIS — R93.1 AGATSTON CORONARY ARTERY CALCIUM SCORE BETWEEN 200 AND 399: ICD-10-CM

## 2018-07-09 ENCOUNTER — HOSPITAL ENCOUNTER (OUTPATIENT)
Dept: CARDIOLOGY | Facility: CLINIC | Age: 53
Discharge: HOME | End: 2018-07-09
Attending: INTERNAL MEDICINE | Admitting: INTERNAL MEDICINE
Payer: COMMERCIAL

## 2018-07-09 DIAGNOSIS — N20.0 NEPHROLITHIASIS: ICD-10-CM

## 2018-07-09 DIAGNOSIS — I10 BENIGN ESSENTIAL HYPERTENSION: ICD-10-CM

## 2018-07-09 LAB
POTASSIUM SERPL-SCNC: 3.5 MMOL/L (ref 3.4–5.3)
PTH-INTACT SERPL-MCNC: 52 PG/ML (ref 18–80)

## 2018-07-09 PROCEDURE — 84132 ASSAY OF SERUM POTASSIUM: CPT | Performed by: INTERNAL MEDICINE

## 2018-07-09 PROCEDURE — 83970 ASSAY OF PARATHORMONE: CPT | Performed by: INTERNAL MEDICINE

## 2018-07-09 PROCEDURE — 36415 COLL VENOUS BLD VENIPUNCTURE: CPT | Performed by: INTERNAL MEDICINE

## 2018-07-09 PROCEDURE — 93790 AMBL BP MNTR W/SW I&R: CPT | Performed by: INTERNAL MEDICINE

## 2018-07-09 PROCEDURE — 93786 AMBL BP MNTR W/SW REC ONLY: CPT

## 2018-08-07 ENCOUNTER — OFFICE VISIT (OUTPATIENT)
Dept: CARDIOLOGY | Facility: CLINIC | Age: 53
End: 2018-08-07
Attending: INTERNAL MEDICINE
Payer: COMMERCIAL

## 2018-08-07 VITALS
DIASTOLIC BLOOD PRESSURE: 74 MMHG | HEIGHT: 65 IN | HEART RATE: 64 BPM | BODY MASS INDEX: 33.69 KG/M2 | WEIGHT: 202.2 LBS | SYSTOLIC BLOOD PRESSURE: 122 MMHG

## 2018-08-07 DIAGNOSIS — I10 BENIGN ESSENTIAL HYPERTENSION: ICD-10-CM

## 2018-08-07 DIAGNOSIS — I25.84 CORONARY ARTERY DISEASE DUE TO CALCIFIED CORONARY LESION: Primary | ICD-10-CM

## 2018-08-07 DIAGNOSIS — E78.5 HYPERLIPIDEMIA LDL GOAL <100: ICD-10-CM

## 2018-08-07 DIAGNOSIS — R00.2 PALPITATIONS: ICD-10-CM

## 2018-08-07 DIAGNOSIS — I25.10 CORONARY ARTERY DISEASE DUE TO CALCIFIED CORONARY LESION: Primary | ICD-10-CM

## 2018-08-07 PROCEDURE — 93000 ELECTROCARDIOGRAM COMPLETE: CPT | Performed by: INTERNAL MEDICINE

## 2018-08-07 PROCEDURE — 99204 OFFICE O/P NEW MOD 45 MIN: CPT | Performed by: INTERNAL MEDICINE

## 2018-08-07 RX ORDER — AMOXICILLIN 500 MG
1200 CAPSULE ORAL DAILY
COMMUNITY

## 2018-08-07 NOTE — LETTER
8/7/2018    Hossein Farah MD  [address]  [facility] [address]    RE: Allen Bills       Dear Colleague,    I had the pleasure of seeing Allen Bills in the [facility].    HPI and Plan:   I had the pleasure of seeing Jerry butts in cardiology clinic on request of Dr. Hossein Deluca for coronary artery disease due to coronary artery calcification.    Patient had a coronary calcium score in 2011 with a score was 211, putting him at 90 -100th percentile for age and sex matched controls.  The LAD score was 1 69.5 and the RCA score was 48.4.  He has a strong family history of coronary artery disease with his father having a heart attack and I informed and in the 60s.  His grandfather also had a coronary artery disease.  He does have history of hypertension and hyperlipidemia.  He is on simvastatin and his last LDL was 35 and 2017.  He tries to eat healthy.  He walks 3 or 4 days a week and lifts weights 3 days a week.  Denies any chest pain or shortness of breath but occasionally has his heart rate racing when he starts walking first.    His main reason for visit is to do everything he can to prevent future cardiovascular events.  He wanted to discuss that as well as to see what follow-up test he should do to see if there is any progression.  He did have a stress echocardiogram last year that was negative for ischemia.  EKG portion of the test was negative with no ischemia.     He also is taking amlodipine and hydrochlorthiazide for his hypertension.  His blood pressure is now well controlled.  He is on baby aspirin.    Physical Exam:   See below    Impression:  1.  Coronary artery disease due to coronary artery calcification    Today, I discussed with him the pathophysiology of plaque recurrence, progression and rupture.  We talked about calcified and noncalcified plaques.  I explained to him that regular exercise, risk factor modification with the cholesterol  lowering and blood pressure lowering as well as baby aspirin for antiplatelet action are the best ways to limit progression of coronary artery atherosclerosis.  A stress echocardiogram last year was reassuring and lack of any significant symptoms also reassuring.  However, he was still concerned about progression that could have been missed on stress testing and is wanting to repeat a calcium score.  I suggested that calcium score may not be completely accurate in terms of determining how he is doing.  Sometimes calcium gets worse with appropriate therapy.  He does complain of some occasional palpitations when he first starts walking and wants to make sure there is no significant stenosis.  We talked about options of CT coronary angiography which he wants to pursue.  The risk of dye and radiation exposure was discussed.  The potential of limited diagnostic ability in area of severe calcification was discussed.  He will return to see my nurse practitioner for about test.  There is no severe disease, I would continue risk factor modification.  He also is using fish oil capsules.    He also is concerned about lifting.  He is not much symptomatic from doing heavy weights.  I suggested that we make sure the aorta is normal in size.    2.  Hypertension  Continue present medications.  Continue follow with Dr. Deluca.    3.  Hyperlipidemia  On simvastatin.  Will repeat lipid profile as it has been over a year    He will return to see my nurse practitioner in follow-up after the above tests.  If there is no significant disease, he can continue to visit with his primary care physician and see us on a as needed basis.  He can also see us with any new symptoms.  Periodic stress echocardiogram every few years may be reasonable.    Sincerely,    Med Santos MD    Orders Placed This Encounter   Procedures     CT Angiogram coronary artery     Lipid Profile     Follow-Up with Cardiac Advanced Practice Provider     EKG 12-lead  complete w/read - Clinics (performed today)       Orders Placed This Encounter   Medications     Omega-3 Fatty Acids (FISH OIL) 1200 MG capsule     Sig: Take 1,200 mg by mouth daily       There are no discontinued medications.      Encounter Diagnoses   Name Primary?     Benign essential hypertension      Palpitations      Hyperlipidemia LDL goal <100      Coronary artery disease due to calcified coronary lesion Yes       CURRENT MEDICATIONS:  Current Outpatient Prescriptions   Medication Sig Dispense Refill     amLODIPine (NORVASC) 5 MG tablet Take 1 tablet (5 mg) by mouth 2 times daily 180 tablet 3     aspirin 81 MG tablet Take 1 tablet by mouth daily.       hydrochlorothiazide (HYDRODIURIL) 25 MG tablet Take 1 tablet (25 mg) by mouth daily 90 tablet 3     levETIRAcetam (KEPPRA) 500 MG tablet TAKE 3 TABS BY MOUTH TWO TIMES DAILY 540 tablet 3     loratadine (CLARITIN) 10 MG capsule Take 10 mg by mouth daily.       Omega-3 Fatty Acids (FISH OIL) 1200 MG capsule Take 1,200 mg by mouth daily       simvastatin (ZOCOR) 20 MG tablet TAKE ONE TABLET BY MOUTH NIGHTLY AT BEDTIME 90 tablet 3       ALLERGIES   No Known Allergies    PAST MEDICAL HISTORY:  Past Medical History:   Diagnosis Date     AVM (arteriovenous malformation) brain 2005    surgery [place]     Elevated serum creatinine      Flank pain 04/2017    ct abd and pelvis nl     H/O colonoscopy 4/16    int hem otherwise nl     Hearing loss in left ear      HTN (hypertension) 2011    added norvasc 6/16     Nephrolithiasis 2005 and 2006    Dr. Chaparro     Normal echocardiogram 2006     Normal stress echocardiogram 2006, 2015, 8/17    nl     Numbness 2006    mri neg     Palpitations 2011    est echo nl [place] [facility] 8/31/11     Screening 2011    ebct score 218, seen by Dr. Jimenez 2011     Seizure (H) 2006    right sided numbness, Dr. Rosales     Tinnitus of left ear 2005       PAST SURGICAL HISTORY:  Past Surgical History:   Procedure Laterality Date     C BRAIN AVM  "SURG,DURAL,COMPLX  2005    [place]     LITHOTRIPSY  2005       FAMILY HISTORY:  Family History   Problem Relation Age of Onset     Diabetes Father      Hypertension Father      Myocardial Infarction Father 68     Breast Cancer Sister        SOCIAL HISTORY:  Social History     Social History     Marital status:      Spouse name: N/A     Number of children: 3     Years of education: N/A     Occupational History     marketing and sales      Social History Main Topics     Smoking status: Never Smoker     Smokeless tobacco: Never Used     Alcohol use 0.0 oz/week     0 Standard drinks or equivalent per week      Comment: 2 drinks monthly     Drug use: No     Sexual activity: Yes     Partners: Female     Other Topics Concern     None     Social History Narrative       Review of Systems:  Skin:  Positive for rash     Eyes:  Negative      ENT:  Positive for hearing loss;tinnitus    Respiratory:  Positive for   MACIEL, walking fast   Cardiovascular:  Negative;chest pain;syncope or near-syncope;cyanosis;fatigue;exercise intolerance Positive for;palpitations;lightheadedness;dizziness feels skipped beats with increased HR  Gastroenterology: Positive for nausea    Genitourinary:  Negative      Musculoskeletal:  Positive for joint pain    Neurologic:  Positive for seizures;numbness or tingling of hands    Psychiatric:  Positive for sleep disturbances    Heme/Lymph/Imm:  Positive for allergies    Endocrine:  Negative        Physical Exam:  Vitals: /74 (BP Location: Left arm, Cuff Size: Adult Large)  Pulse 64  Ht 1.651 m (5' 5\")  Wt 91.7 kg (202 lb 3.2 oz)  BMI 33.65 kg/m2    Constitutional:  cooperative;well developed        Skin:  warm and dry to the touch          Head:  normocephalic        Eyes:  pupils equal and round        Lymph:No Cervical lymphadenopathy present     ENT:  no pallor or cyanosis, dentition good        Neck:  JVP normal        Respiratory:  clear to auscultation         Cardiac: regular " rhythm;normal S1 and S2     no presence of murmur          not assessed this visit                                        GI:  not assessed this visit        Extremities and Muscular Skeletal:  no edema              Neurological:  no gross motor deficits        Psych:  Alert and Oriented x 3        CC  Hossein Farah MD  [address]  [place] [address]                Thank you for allowing me to participate in the care of your patient.      Sincerely,     Med Santos MD     [facility]    cc:   Hossein Farah MD  [address]  [place] [address]

## 2018-08-07 NOTE — MR AVS SNAPSHOT
After Visit Summary   8/7/2018    Allen Bills    MRN: [id number]           Patient Information     Date Of Birth          1965        Visit Information        Provider Department      8/7/2018 8:15 AM Med Santos MD [facility]        Today's Diagnoses     Coronary artery disease due to calcified coronary lesion    -  1    Benign essential hypertension        Palpitations        Hyperlipidemia LDL goal <100           Follow-ups after your visit        Additional Services     Follow-Up with Cardiac Advanced Practice Provider                 Future tests that were ordered for you today     Open Future Orders        Priority Expected Expires Ordered    Follow-Up with Cardiac Advanced Practice Provider Routine 8/14/2018 8/7/2019 8/7/2018    Lipid Profile Routine 8/14/2018 8/7/2019 8/7/2018    CT Angiogram coronary artery Routine 8/14/2018 8/7/2019 8/7/2018            Who to contact     If you have questions or need follow up information about today's clinic visit or your schedule please contact [facility] directly at [phone].  Normal or non-critical lab and imaging results will be communicated to you by MyChart, letter or phone within 4 business days after the clinic has received the results. If you do not hear from us within 7 days, please contact the clinic through MyChart or phone. If you have a critical or abnormal lab result, we will notify you by phone as soon as possible.  Submit refill requests through [employer] or call your pharmacy and they will forward the refill request to us. Please allow 3 business days for your refill to be completed.          Additional Information About Your Visit        Care EveryWhere ID     This is your Care EveryWhere ID. This could be used by other organizations to access your [facility] medical records  [id number]        Your Vitals Were     Pulse Height BMI (Body  "Mass Index)             64 1.651 m (5' 5\") 33.65 kg/m2          Blood Pressure from Last 3 Encounters:   08/07/18 122/74   06/25/18 127/65   08/15/17 122/70    Weight from Last 3 Encounters:   08/07/18 91.7 kg (202 lb 3.2 oz)   06/25/18 91.4 kg (201 lb 6.4 oz)   08/15/17 91.2 kg (201 lb)              We Performed the Following     EKG 12-lead complete w/read - Clinics (performed today)        Primary Care Provider Office Phone # Fax #    Hossein Farah [phone] [address] [address]        Equal Access to Services     DALIA LICONA : Hadii lalo arce hadasho Sojaymeali, waaxda luqadaha, qaybta kaalmada adeegyada, yolanda lopez . [facility] [phone].    ATENCIÓN: Si habla español, tiene a hernandez disposición servicios gratuitos de asistencia lingüística. Llame al [phone].    We comply with applicable federal civil rights laws and [place] laws. We do not discriminate on the basis of race, color, national origin, age, disability, sex, sexual orientation, or gender identity.            Thank you!     Thank you for choosing [facility]  for your care. Our goal is always to provide you with excellent care. Hearing back from our patients is one way we can continue to improve our services. Please take a few minutes to complete the written survey that you may receive in the mail after your visit with us. Thank you!             Your Updated Medication List - Protect others around you: Learn how to safely use, store and throw away your medicines at www.disposemymeds.org.          This list is accurate as of 8/7/18  8:54 AM.  Always use your most recent med list.                   Brand Name Dispense Instructions for use Diagnosis    amLODIPine 5 MG tablet    NORVASC    180 tablet    Take 1 tablet (5 mg) by mouth 2 times daily    Benign essential hypertension       aspirin 81 MG tablet      Take 1 tablet by mouth " daily.        CLARITIN 10 MG capsule   Generic drug:  loratadine      Take 10 mg by mouth daily.        fish Oil 1200 MG capsule      Take 1,200 mg by mouth daily        hydrochlorothiazide 25 MG tablet    HYDRODIURIL    90 tablet    Take 1 tablet (25 mg) by mouth daily    Benign essential hypertension       levETIRAcetam 500 MG tablet    KEPPRA    540 tablet    TAKE 3 TABS BY MOUTH TWO TIMES DAILY    Convulsions, unspecified convulsion type (H)       simvastatin 20 MG tablet    ZOCOR    90 tablet    TAKE ONE TABLET BY MOUTH NIGHTLY AT BEDTIME    Hyperlipidemia LDL goal <100

## 2018-08-07 NOTE — LETTER
8/7/2018    Hossein Farah MD  [address]  [facility] [address]    RE: Allne Bills       Dear Colleague,    I had the pleasure of seeing Allen Bills in the [facility].    HPI and Plan:   I had the pleasure of seeing Jerry butts in cardiology clinic on request of Dr. Hossein Deluca for coronary artery disease due to coronary artery calcification.    Patient had a coronary calcium score in 2011 with a score was 211, putting him at 90 -100th percentile for age and sex matched controls.  The LAD score was 1 69.5 and the RCA score was 48.4.  He has a strong family history of coronary artery disease with his father having a heart attack and I informed and in the 60s.  His grandfather also had a coronary artery disease.  He does have history of hypertension and hyperlipidemia.  He is on simvastatin and his last LDL was 35 and 2017.  He tries to eat healthy.  He walks 3 or 4 days a week and lifts weights 3 days a week.  Denies any chest pain or shortness of breath but occasionally has his heart rate racing when he starts walking first.    His main reason for visit is to do everything he can to prevent future cardiovascular events.  He wanted to discuss that as well as to see what follow-up test he should do to see if there is any progression.  He did have a stress echocardiogram last year that was negative for ischemia.  EKG portion of the test was negative with no ischemia.     He also is taking amlodipine and hydrochlorthiazide for his hypertension.  His blood pressure is now well controlled.  He is on baby aspirin.    Physical Exam:   See below    Impression:  1.  Coronary artery disease due to coronary artery calcification    Today, I discussed with him the pathophysiology of plaque recurrence, progression and rupture.  We talked about calcified and noncalcified plaques.  I explained to him that regular exercise, risk factor modification with the cholesterol  lowering and blood pressure lowering as well as baby aspirin for antiplatelet action are the best ways to limit progression of coronary artery atherosclerosis.  A stress echocardiogram last year was reassuring and lack of any significant symptoms also reassuring.  However, he was still concerned about progression that could have been missed on stress testing and is wanting to repeat a calcium score.  I suggested that calcium score may not be completely accurate in terms of determining how he is doing.  Sometimes calcium gets worse with appropriate therapy.  He does complain of some occasional palpitations when he first starts walking and wants to make sure there is no significant stenosis.  We talked about options of CT coronary angiography which he wants to pursue.  The risk of dye and radiation exposure was discussed.  The potential of limited diagnostic ability in area of severe calcification was discussed.  He will return to see my nurse practitioner for about test.  There is no severe disease, I would continue risk factor modification.  He also is using fish oil capsules.    He also is concerned about lifting.  He is not much symptomatic from doing heavy weights.  I suggested that we make sure the aorta is normal in size.    2.  Hypertension  Continue present medications.  Continue follow with Dr. Deluca.    3.  Hyperlipidemia  On simvastatin.  Will repeat lipid profile as it has been over a year    He will return to see my nurse practitioner in follow-up after the above tests.  If there is no significant disease, he can continue to visit with his primary care physician and see us on a as needed basis.  He can also see us with any new symptoms.  Periodic stress echocardiogram every few years may be reasonable.    Sincerely,    Med Santos MD    Orders Placed This Encounter   Procedures     CT Angiogram coronary artery     Lipid Profile     Follow-Up with Cardiac Advanced Practice Provider     EKG 12-lead  complete w/read - Clinics (performed today)       Orders Placed This Encounter   Medications     Omega-3 Fatty Acids (FISH OIL) 1200 MG capsule     Sig: Take 1,200 mg by mouth daily       There are no discontinued medications.      Encounter Diagnoses   Name Primary?     Benign essential hypertension      Palpitations      Hyperlipidemia LDL goal <100      Coronary artery disease due to calcified coronary lesion Yes       CURRENT MEDICATIONS:  Current Outpatient Prescriptions   Medication Sig Dispense Refill     amLODIPine (NORVASC) 5 MG tablet Take 1 tablet (5 mg) by mouth 2 times daily 180 tablet 3     aspirin 81 MG tablet Take 1 tablet by mouth daily.       hydrochlorothiazide (HYDRODIURIL) 25 MG tablet Take 1 tablet (25 mg) by mouth daily 90 tablet 3     levETIRAcetam (KEPPRA) 500 MG tablet TAKE 3 TABS BY MOUTH TWO TIMES DAILY 540 tablet 3     loratadine (CLARITIN) 10 MG capsule Take 10 mg by mouth daily.       Omega-3 Fatty Acids (FISH OIL) 1200 MG capsule Take 1,200 mg by mouth daily       simvastatin (ZOCOR) 20 MG tablet TAKE ONE TABLET BY MOUTH NIGHTLY AT BEDTIME 90 tablet 3       ALLERGIES   No Known Allergies    PAST MEDICAL HISTORY:  Past Medical History:   Diagnosis Date     AVM (arteriovenous malformation) brain 2005    surgery [place]     Elevated serum creatinine      Flank pain 04/2017    ct abd and pelvis nl     H/O colonoscopy 4/16    int hem otherwise nl     Hearing loss in left ear      HTN (hypertension) 2011    added norvasc 6/16     Nephrolithiasis 2005 and 2006    Dr. Chaparro     Normal echocardiogram 2006     Normal stress echocardiogram 2006, 2015, 8/17    nl     Numbness 2006    mri neg     Palpitations 2011    est echo nl [place] [facility] 8/31/11     Screening 2011    ebct score 218, seen by Dr. Jimenez 2011     Seizure (H) 2006    right sided numbness, Dr. Rosales     Tinnitus of left ear 2005       PAST SURGICAL HISTORY:  Past Surgical History:   Procedure Laterality Date     C BRAIN AVM  "SURG,DURAL,COMPLX  2005    [place]     LITHOTRIPSY  2005       FAMILY HISTORY:  Family History   Problem Relation Age of Onset     Diabetes Father      Hypertension Father      Myocardial Infarction Father 68     Breast Cancer Sister        SOCIAL HISTORY:  Social History     Social History     Marital status:      Spouse name: N/A     Number of children: 3     Years of education: N/A     Occupational History     marketing and sales      Social History Main Topics     Smoking status: Never Smoker     Smokeless tobacco: Never Used     Alcohol use 0.0 oz/week     0 Standard drinks or equivalent per week      Comment: 2 drinks monthly     Drug use: No     Sexual activity: Yes     Partners: Female     Other Topics Concern     None     Social History Narrative       Review of Systems:  Skin:  Positive for rash     Eyes:  Negative      ENT:  Positive for hearing loss;tinnitus    Respiratory:  Positive for   MACIEL, walking fast   Cardiovascular:  Negative;chest pain;syncope or near-syncope;cyanosis;fatigue;exercise intolerance Positive for;palpitations;lightheadedness;dizziness feels skipped beats with increased HR  Gastroenterology: Positive for nausea    Genitourinary:  Negative      Musculoskeletal:  Positive for joint pain    Neurologic:  Positive for seizures;numbness or tingling of hands    Psychiatric:  Positive for sleep disturbances    Heme/Lymph/Imm:  Positive for allergies    Endocrine:  Negative        Physical Exam:  Vitals: /74 (BP Location: Left arm, Cuff Size: Adult Large)  Pulse 64  Ht 1.651 m (5' 5\")  Wt 91.7 kg (202 lb 3.2 oz)  BMI 33.65 kg/m2    Constitutional:  cooperative;well developed        Skin:  warm and dry to the touch          Head:  normocephalic        Eyes:  pupils equal and round        Lymph:No Cervical lymphadenopathy present     ENT:  no pallor or cyanosis, dentition good        Neck:  JVP normal        Respiratory:  clear to auscultation         Cardiac: regular " rhythm;normal S1 and S2     no presence of murmur          not assessed this visit                                        GI:  not assessed this visit        Extremities and Muscular Skeletal:  no edema              Neurological:  no gross motor deficits        Psych:  Alert and Oriented x 3        Thank you for allowing me to participate in the care of your patient.    Sincerely,     Med Santos MD     [facility]

## 2018-08-07 NOTE — PROGRESS NOTES
HPI and Plan:   I had the pleasure of seeing Jerry butts in cardiology clinic on request of Dr. Hosesin Deluca for coronary artery disease due to coronary artery calcification.    Patient had a coronary calcium score in 2011 with a score was 211, putting him at 90 -100th percentile for age and sex matched controls.  The LAD score was 1 69.5 and the RCA score was 48.4.  He has a strong family history of coronary artery disease with his father having a heart attack and I informed and in the 60s.  His grandfather also had a coronary artery disease.  He does have history of hypertension and hyperlipidemia.  He is on simvastatin and his last LDL was 35 and 2017.  He tries to eat healthy.  He walks 3 or 4 days a week and lifts weights 3 days a week.  Denies any chest pain or shortness of breath but occasionally has his heart rate racing when he starts walking first.    His main reason for visit is to do everything he can to prevent future cardiovascular events.  He wanted to discuss that as well as to see what follow-up test he should do to see if there is any progression.  He did have a stress echocardiogram last year that was negative for ischemia.  EKG portion of the test was negative with no ischemia.     He also is taking amlodipine and hydrochlorthiazide for his hypertension.  His blood pressure is now well controlled.  He is on baby aspirin.    Physical Exam:   See below    Impression:  1.  Coronary artery disease due to coronary artery calcification    Today, I discussed with him the pathophysiology of plaque recurrence, progression and rupture.  We talked about calcified and noncalcified plaques.  I explained to him that regular exercise, risk factor modification with the cholesterol lowering and blood pressure lowering as well as baby aspirin for antiplatelet action are the best ways to limit progression of coronary artery atherosclerosis.  A stress echocardiogram last year was reassuring and lack of any  significant symptoms also reassuring.  However, he was still concerned about progression that could have been missed on stress testing and is wanting to repeat a calcium score.  I suggested that calcium score may not be completely accurate in terms of determining how he is doing.  Sometimes calcium gets worse with appropriate therapy.  He does complain of some occasional palpitations when he first starts walking and wants to make sure there is no significant stenosis.  We talked about options of CT coronary angiography which he wants to pursue.  The risk of dye and radiation exposure was discussed.  The potential of limited diagnostic ability in area of severe calcification was discussed.  He will return to see my nurse practitioner for about test.  There is no severe disease, I would continue risk factor modification.  He also is using fish oil capsules.    He also is concerned about lifting.  He is not much symptomatic from doing heavy weights.  I suggested that we make sure the aorta is normal in size.    2.  Hypertension  Continue present medications.  Continue follow with Dr. Deluca.    3.  Hyperlipidemia  On simvastatin.  Will repeat lipid profile as it has been over a year    He will return to see my nurse practitioner in follow-up after the above tests.  If there is no significant disease, he can continue to visit with his primary care physician and see us on a as needed basis.  He can also see us with any new symptoms.  Periodic stress echocardiogram every few years may be reasonable.    Sincerely,    Med Santos MD    Orders Placed This Encounter   Procedures     CT Angiogram coronary artery     Lipid Profile     Follow-Up with Cardiac Advanced Practice Provider     EKG 12-lead complete w/read - Clinics (performed today)       Orders Placed This Encounter   Medications     Omega-3 Fatty Acids (FISH OIL) 1200 MG capsule     Sig: Take 1,200 mg by mouth daily       There are no discontinued  medications.      Encounter Diagnoses   Name Primary?     Benign essential hypertension      Palpitations      Hyperlipidemia LDL goal <100      Coronary artery disease due to calcified coronary lesion Yes       CURRENT MEDICATIONS:  Current Outpatient Prescriptions   Medication Sig Dispense Refill     amLODIPine (NORVASC) 5 MG tablet Take 1 tablet (5 mg) by mouth 2 times daily 180 tablet 3     aspirin 81 MG tablet Take 1 tablet by mouth daily.       hydrochlorothiazide (HYDRODIURIL) 25 MG tablet Take 1 tablet (25 mg) by mouth daily 90 tablet 3     levETIRAcetam (KEPPRA) 500 MG tablet TAKE 3 TABS BY MOUTH TWO TIMES DAILY 540 tablet 3     loratadine (CLARITIN) 10 MG capsule Take 10 mg by mouth daily.       Omega-3 Fatty Acids (FISH OIL) 1200 MG capsule Take 1,200 mg by mouth daily       simvastatin (ZOCOR) 20 MG tablet TAKE ONE TABLET BY MOUTH NIGHTLY AT BEDTIME 90 tablet 3       ALLERGIES   No Known Allergies    PAST MEDICAL HISTORY:  Past Medical History:   Diagnosis Date     AVM (arteriovenous malformation) brain 2005    surgery [facility]     Elevated serum creatinine      Flank pain 04/2017    ct abd and pelvis nl     H/O colonoscopy 4/16    int hem otherwise nl     Hearing loss in left ear      HTN (hypertension) 2011    added norvasc 6/16     Nephrolithiasis 2005 and 2006    Dr. Chaparro     Normal echocardiogram 2006     Normal stress echocardiogram 2006, 2015, 8/17    nl     Numbness 2006    mri neg     Palpitations 2011    est echo nl [facility] 8/31/11     Screening 2011    ebct score 218, seen by Dr. Jimenez 2011     Seizure (H) 2006    right sided numbness, Dr. Rosales     Tinnitus of left ear 2005       PAST SURGICAL HISTORY:  Past Surgical History:   Procedure Laterality Date     C BRAIN AVM SURG,DURAL,COMPLX  2005    [facility]     LITHOTRIPSY  2005       FAMILY HISTORY:  Family History   Problem Relation Age of Onset     Diabetes Father      Hypertension Father      Myocardial Infarction Father 68     Breast  "Cancer Sister        SOCIAL HISTORY:  Social History     Social History     Marital status:      Spouse name: N/A     Number of children: 3     Years of education: N/A     Occupational History     marketing and sales      Social History Main Topics     Smoking status: Never Smoker     Smokeless tobacco: Never Used     Alcohol use 0.0 oz/week     0 Standard drinks or equivalent per week      Comment: 2 drinks monthly     Drug use: No     Sexual activity: Yes     Partners: Female     Other Topics Concern     None     Social History Narrative       Review of Systems:  Skin:  Positive for rash     Eyes:  Negative      ENT:  Positive for hearing loss;tinnitus    Respiratory:  Positive for   MACIEL, walking fast   Cardiovascular:  Negative;chest pain;syncope or near-syncope;cyanosis;fatigue;exercise intolerance Positive for;palpitations;lightheadedness;dizziness feels skipped beats with increased HR  Gastroenterology: Positive for nausea    Genitourinary:  Negative      Musculoskeletal:  Positive for joint pain    Neurologic:  Positive for seizures;numbness or tingling of hands    Psychiatric:  Positive for sleep disturbances    Heme/Lymph/Imm:  Positive for allergies    Endocrine:  Negative        Physical Exam:  Vitals: /74 (BP Location: Left arm, Cuff Size: Adult Large)  Pulse 64  Ht 1.651 m (5' 5\")  Wt 91.7 kg (202 lb 3.2 oz)  BMI 33.65 kg/m2    Constitutional:  cooperative;well developed        Skin:  warm and dry to the touch          Head:  normocephalic        Eyes:  pupils equal and round        Lymph:No Cervical lymphadenopathy present     ENT:  no pallor or cyanosis, dentition good        Neck:  JVP normal        Respiratory:  clear to auscultation         Cardiac: regular rhythm;normal S1 and S2     no presence of murmur          not assessed this visit                                        GI:  not assessed this visit        Extremities and Muscular Skeletal:  no edema          "     Neurological:  no gross motor deficits        Psych:  Alert and Oriented x 3        CC  Hossein Fraah MD  [id number]  [place] [address]

## 2018-08-12 DIAGNOSIS — E78.5 HYPERLIPIDEMIA LDL GOAL <100: ICD-10-CM

## 2018-08-13 NOTE — TELEPHONE ENCOUNTER
"Last Written Prescription Date:  8/18/2017  Last Fill Quantity: 90,  # refills: 3   Last office visit: 6/25/2018 with prescribing provider:     Future Office Visit:   Next 5 appointments (look out 90 days)     Aug 24, 2018  9:00 AM CDT   Return Visit with BRENDEN Sauer CNP   [facility] ([facility])    [address] [place] [address] [id number] [phone] OPT 2                 Requested Prescriptions   Pending Prescriptions Disp Refills     simvastatin (ZOCOR) 20 MG tablet [Pharmacy Med Name: SIMVASTATIN 20 MG TABLET] 90 tablet 3     Sig: TAKE ONE TABLET BY MOUTH NIGHTLY AT BEDTIME    Statins Protocol Failed    8/12/2018 11:58 PM       Failed - No abnormal creatine kinase in past 12 months    Recent Labs   Lab Test  08/15/17   1600   CKT  349*               Passed - LDL on file in past 12 months    Recent Labs   Lab Test  08/15/17   1600   LDL  35            Passed - Recent (12 mo) or future (30 days) visit within the authorizing provider's specialty    Patient had office visit in the last 12 months or has a visit in the next 30 days with authorizing provider or within the authorizing provider's specialty.  See \"Patient Info\" tab in inbasket, or \"Choose Columns\" in Meds & Orders section of the refill encounter.           Passed - Patient is age 18 or older        Jacy Otero MA 10:52 AM August 13, 2018            "

## 2018-08-14 RX ORDER — SIMVASTATIN 20 MG
TABLET ORAL
Qty: 90 TABLET | Refills: 3 | Status: SHIPPED | OUTPATIENT
Start: 2018-08-14 | End: 2018-08-24

## 2018-08-14 NOTE — TELEPHONE ENCOUNTER
Routing refill request to provider for review/approval because:  Labs out of range:    08/15/17   1600    CKT  349*     Please review and authorize if appropriate,     Thank you,   Marcelo LATHAM, RN

## 2018-08-17 ENCOUNTER — HOSPITAL ENCOUNTER (OUTPATIENT)
Dept: CARDIOLOGY | Facility: CLINIC | Age: 53
Discharge: HOME | End: 2018-08-17
Attending: INTERNAL MEDICINE | Admitting: INTERNAL MEDICINE
Payer: COMMERCIAL

## 2018-08-17 DIAGNOSIS — I25.84 CORONARY ARTERY DISEASE DUE TO CALCIFIED CORONARY LESION: ICD-10-CM

## 2018-08-17 DIAGNOSIS — I25.10 CORONARY ARTERY DISEASE DUE TO CALCIFIED CORONARY LESION: ICD-10-CM

## 2018-08-17 PROCEDURE — 25000128 H RX IP 250 OP 636: Performed by: INTERNAL MEDICINE

## 2018-08-17 PROCEDURE — 75574 CT ANGIO HRT W/3D IMAGE: CPT | Mod: 26 | Performed by: INTERNAL MEDICINE

## 2018-08-17 PROCEDURE — 25000132 ZZH RX MED GY IP 250 OP 250 PS 637: Performed by: INTERNAL MEDICINE

## 2018-08-17 PROCEDURE — 75574 CT ANGIO HRT W/3D IMAGE: CPT

## 2018-08-17 PROCEDURE — 40000556 ZZH STATISTIC PERIPHERAL IV START W US GUIDANCE

## 2018-08-17 PROCEDURE — 25000125 ZZHC RX 250: Performed by: INTERNAL MEDICINE

## 2018-08-17 RX ORDER — ACYCLOVIR 200 MG/1
0-1 CAPSULE ORAL
Status: DISCONTINUED | OUTPATIENT
Start: 2018-08-17 | End: 2018-08-18 | Stop reason: HOSPADM

## 2018-08-17 RX ORDER — NITROGLYCERIN 0.4 MG/1
0.4 TABLET SUBLINGUAL
Status: DISCONTINUED | OUTPATIENT
Start: 2018-08-17 | End: 2018-08-18 | Stop reason: HOSPADM

## 2018-08-17 RX ORDER — IOPAMIDOL 755 MG/ML
150 INJECTION, SOLUTION INTRAVASCULAR ONCE
Status: COMPLETED | OUTPATIENT
Start: 2018-08-17 | End: 2018-08-17

## 2018-08-17 RX ORDER — METOPROLOL TARTRATE 1 MG/ML
5-15 INJECTION, SOLUTION INTRAVENOUS
Status: DISCONTINUED | OUTPATIENT
Start: 2018-08-17 | End: 2018-08-18 | Stop reason: HOSPADM

## 2018-08-17 RX ORDER — METOPROLOL TARTRATE 50 MG
50-100 TABLET ORAL
Status: DISCONTINUED | OUTPATIENT
Start: 2018-08-17 | End: 2018-08-18 | Stop reason: HOSPADM

## 2018-08-17 RX ADMIN — NITROGLYCERIN 0.4 MG: 0.4 TABLET SUBLINGUAL at 10:30

## 2018-08-17 RX ADMIN — IOPAMIDOL 120 ML: 755 INJECTION, SOLUTION INTRAVENOUS at 10:44

## 2018-08-17 RX ADMIN — SODIUM CHLORIDE 100 ML: 9 INJECTION, SOLUTION INTRAVENOUS at 10:44

## 2018-08-17 RX ADMIN — METOPROLOL TARTRATE 20 MG: 5 INJECTION, SOLUTION INTRAVENOUS at 10:30

## 2018-08-20 ENCOUNTER — TELEPHONE (OUTPATIENT)
Dept: CARDIOLOGY | Facility: CLINIC | Age: 53
End: 2018-08-20

## 2018-08-20 NOTE — TELEPHONE ENCOUNTER
Pt informed of CTa results per DR Santos:  Notes Recorded by Med Santos MD on 8/17/2018 at 4:19 PM  Call pt w results. Mild to moderate disease. No significant obstructive disease. Needs risk factor modification. Small hiatal hernia noted, can be addressed by PMD. Keep appt w JASON Carr RN

## 2018-08-24 ENCOUNTER — OFFICE VISIT (OUTPATIENT)
Dept: CARDIOLOGY | Facility: CLINIC | Age: 53
End: 2018-08-24
Attending: INTERNAL MEDICINE
Payer: COMMERCIAL

## 2018-08-24 VITALS
BODY MASS INDEX: 32.39 KG/M2 | HEART RATE: 64 BPM | SYSTOLIC BLOOD PRESSURE: 110 MMHG | WEIGHT: 194.4 LBS | HEIGHT: 65 IN | DIASTOLIC BLOOD PRESSURE: 60 MMHG

## 2018-08-24 DIAGNOSIS — I25.10 CORONARY ARTERY DISEASE DUE TO CALCIFIED CORONARY LESION: ICD-10-CM

## 2018-08-24 DIAGNOSIS — I25.84 CORONARY ARTERY DISEASE DUE TO CALCIFIED CORONARY LESION: ICD-10-CM

## 2018-08-24 LAB
CHOLEST SERPL-MCNC: 120 MG/DL
HDLC SERPL-MCNC: 46 MG/DL
LDLC SERPL CALC-MCNC: 62 MG/DL
NONHDLC SERPL-MCNC: 74 MG/DL
TRIGL SERPL-MCNC: 58 MG/DL

## 2018-08-24 PROCEDURE — 99214 OFFICE O/P EST MOD 30 MIN: CPT | Performed by: NURSE PRACTITIONER

## 2018-08-24 PROCEDURE — 80061 LIPID PANEL: CPT | Performed by: INTERNAL MEDICINE

## 2018-08-24 PROCEDURE — 36415 COLL VENOUS BLD VENIPUNCTURE: CPT | Performed by: INTERNAL MEDICINE

## 2018-08-24 RX ORDER — ATORVASTATIN CALCIUM 20 MG/1
20 TABLET, FILM COATED ORAL DAILY
Qty: 90 TABLET | Refills: 3 | Status: SHIPPED | OUTPATIENT
Start: 2018-08-24 | End: 2018-12-03

## 2018-08-24 RX ORDER — ATORVASTATIN CALCIUM 10 MG/1
10 TABLET, FILM COATED ORAL DAILY
Qty: 90 TABLET | Refills: 3 | Status: SHIPPED | OUTPATIENT
Start: 2018-08-24 | End: 2018-08-24

## 2018-08-24 NOTE — PROGRESS NOTES
HPI and Plan:   I had the pleasure of seeing Allen Bills today in cardiology clinic follow up. He is a pleasant 53 year old patient of Dr. Santos with a history of coronary artery disease based on CT calcium score is and CT coronary angiography.    Mr. Bills has a family history of premature coronary artery disease and has concern based on his CT calcium score done in 2011 about progression of his disease.  He therefore sought Dr. Santos's recommendations.  Previous stress echocardiogram a year ago was negative for ischemia, the EKG portion of the test was also negative.    He was recommended to undergo a CT coronary angiogram which we reviewed today.  According to his calcium score he has had some increase in calcium specifically in the LAD artery has score went from 169 up to 205 and in the right coronary artery the score went from 48 up to 173.His calcium score puts him in the 95th percentile compared to age and gender matched control group.    Looking at his CT coronary angiogram he has minimal disease in the left main.  His LAD has mild to moderate proximal narrowing with eccentric calcified plaque just beyond the bifurcation, there is also mild proximal narrowing and a second diagonal branch with calcified plaque.  His circumflex has no detectable stenosis or plaque.  His right coronary artery has mild proximal narrowing with calcified plaque followed by a mild to moderate mid vessel narrowing with calcified plaque in the mid distal narrowing with calcified plaque.    Today Mr. Bills tells me he is doing well.  He does not think he has any chest pain.  Sometimes when he is lifting weights he feels some pain in his arm it sounds like it short-lived and more likely to must be musculoskeletal.  He walks daily without any chest neck or jaw pain or shortness of breath.  He does wear a fit bed and notices when he first starts walking that he will get some faster heart rates but the subsided he has no  associated symptoms with them.    Labs Reviewed:  total cholesterol 120, HDL 46, LDL 62, triglycerides 58     Physical Exam  Please see Below     Assessment and Plan  1.  Coronary artery disease.  His CT coronary angiogram shows mild to moderate disease in his LAD   And his right coronary artery.  He had a normal stress echocardiogram a year ago.  He is not having any obvious ischemic symptoms.  He wants to optimize his risk factors.  He knows he should lose weight but this is difficult for him to do.  He is on simvastatin, but brought up the question of if he should be on a stronger statin.  I did go ahead and switch him to Lipitor 20, we will repeat cholesterol numbers in 3 months.  His blood pressure is well controlled.  He does not need further cardiac evaluation at this time.  He could have a repeat stress echo in a few years.  2.  Palpitations.  He says when he first starts walking he notices a faster heart rate, he wears a fit bit and says it goes up to 170 but then it goes down he does not have any associated symptoms with this.  I did offer to do a monitor for him, at this time he does not want to wear one.    Thank you for allowing me to care for Allen Bills today.    BRENDEN Chauhan, CNP  Cardiology    Voice recognition software was used for this note, I have reviewed this note, but errors may have been missed.    Orders Placed This Encounter   Procedures     Lipid Profile     ALT     Orders Placed This Encounter   Medications     DISCONTD: atorvastatin (LIPITOR) 10 MG tablet     Sig: Take 1 tablet (10 mg) by mouth daily     Dispense:  90 tablet     Refill:  3     atorvastatin (LIPITOR) 20 MG tablet     Sig: Take 1 tablet (20 mg) by mouth daily     Dispense:  90 tablet     Refill:  3     Medications Discontinued During This Encounter   Medication Reason     simvastatin (ZOCOR) 20 MG tablet      simvastatin (ZOCOR) 20 MG tablet      atorvastatin (LIPITOR) 10 MG tablet Reorder         CURRENT  MEDICATIONS:  Current Outpatient Prescriptions   Medication Sig Dispense Refill     amLODIPine (NORVASC) 5 MG tablet Take 1 tablet (5 mg) by mouth 2 times daily 180 tablet 3     aspirin 81 MG tablet Take 1 tablet by mouth daily.       atorvastatin (LIPITOR) 20 MG tablet Take 1 tablet (20 mg) by mouth daily 90 tablet 3     hydrochlorothiazide (HYDRODIURIL) 25 MG tablet Take 1 tablet (25 mg) by mouth daily 90 tablet 3     levETIRAcetam (KEPPRA) 500 MG tablet TAKE 3 TABS BY MOUTH TWO TIMES DAILY 540 tablet 3     loratadine (CLARITIN) 10 MG capsule Take 10 mg by mouth daily.       Omega-3 Fatty Acids (FISH OIL) 1200 MG capsule Take 1,200 mg by mouth daily       [DISCONTINUED] atorvastatin (LIPITOR) 10 MG tablet Take 1 tablet (10 mg) by mouth daily 90 tablet 3       ALLERGIES   No Known Allergies    PAST MEDICAL HISTORY:  Past Medical History:   Diagnosis Date     AVM (arteriovenous malformation) brain 2005    surgery [place]     Elevated serum creatinine      Flank pain 04/2017    ct abd and pelvis nl     H/O colonoscopy 4/16    int hem otherwise nl     Hearing loss in left ear      HTN (hypertension) 2011    added norvasc 6/16     Nephrolithiasis 2005 and 2006    Dr. Chaparro     Normal echocardiogram 2006     Normal stress echocardiogram 2006, 2015, 8/17    nl     Numbness 2006    mri neg     Palpitations 2011    est echo nl [facility] 8/31/11     Screening 2011    ebct score 218, seen by Dr. Jimenez 2011     Seizure (H) 2006    right sided numbness, Dr. Rosales     Tinnitus of left ear 2005       PAST SURGICAL HISTORY:  Past Surgical History:   Procedure Laterality Date     C BRAIN AVM SURG,DURAL,COMPLX  2005    [place]     LITHOTRIPSY  2005       FAMILY HISTORY:  Family History   Problem Relation Age of Onset     Diabetes Father      Hypertension Father      Myocardial Infarction Father 68     Breast Cancer Sister        SOCIAL HISTORY:  Social History     Social History     Marital status:      Spouse name: N/A  "    Number of children: 3     Years of education: N/A     Occupational History     marketing and sales      Social History Main Topics     Smoking status: Never Smoker     Smokeless tobacco: Never Used     Alcohol use 0.0 oz/week     0 Standard drinks or equivalent per week      Comment: 2 drinks monthly     Drug use: No     Sexual activity: Yes     Partners: Female     Other Topics Concern     None     Social History Narrative       Review of Systems:  Skin:  Negative       Eyes:  Negative      ENT:  Positive for hearing loss;tinnitus    Respiratory:  Negative       Cardiovascular:    Positive for;lightheadedness;palpitations feels skipped beats with increased HR  Gastroenterology: Negative      Genitourinary:  Negative      Musculoskeletal:  Positive for joint pain    Neurologic:  Positive for seizures;numbness or tingling of hands    Psychiatric:  Positive for sleep disturbances    Heme/Lymph/Imm:  Positive for allergies    Endocrine:  Negative        Physical Exam:  Vitals: /60  Pulse 64  Ht 1.651 m (5' 5\")  Wt 88.2 kg (194 lb 6.4 oz)  BMI 32.35 kg/m2    Constitutional:  cooperative;well developed        Skin:  warm and dry to the touch          Head:  normocephalic        Eyes:  pupils equal and round        Lymph:No Cervical lymphadenopathy present     ENT:  no pallor or cyanosis, dentition good        Neck:  JVP normal        Respiratory:  clear to auscultation         Cardiac: regular rhythm;normal S1 and S2     no presence of murmur          not assessed this visit                                        GI:  not assessed this visit        Extremities and Muscular Skeletal:  no edema              Neurological:  no gross motor deficits        Psych:  Alert and Oriented x 3    Encounter Diagnosis   Name Primary?     Coronary artery disease due to calcified coronary lesion        Recent Lab Results:  LIPID RESULTS:  Lab Results   Component Value Date    CHOL 120 08/24/2018    HDL 46 08/24/2018    LDL " 62 08/24/2018    TRIG 58 08/24/2018    CHOLHDLRATIO 2.6 03/20/2015       LIVER ENZYME RESULTS:  Lab Results   Component Value Date    AST 29 08/15/2017    ALT 48 08/15/2017       CBC RESULTS:  Lab Results   Component Value Date    WBC 7.5 08/15/2017    RBC 4.66 08/15/2017    HGB 14.6 08/15/2017    HCT 43.1 08/15/2017    MCV 93 08/15/2017    MCH 31.3 08/15/2017    MCHC 33.9 08/15/2017    RDW 12.7 08/15/2017     08/15/2017       BMP RESULTS:  Lab Results   Component Value Date     06/25/2018    POTASSIUM 3.5 07/09/2018    CHLORIDE 102 06/25/2018    CO2 25 06/25/2018    ANIONGAP 12 06/25/2018    GLC 79 06/25/2018    BUN 22 06/25/2018    CR 1.20 06/25/2018    GFRESTIMATED 63 06/25/2018    GFRESTBLACK 76 06/25/2018    GLADYS 9.2 06/25/2018        A1C RESULTS:  No results found for: A1C    INR RESULTS:  Lab Results   Component Value Date    INR 0.92 08/01/2005           CC  Med Santos MD  [id number]  [id number]  [place] [id number]

## 2018-08-24 NOTE — LETTER
8/24/2018    Hossein Fraah MD  [address]  [place] [address]    RE: Allen Bills       Dear Colleague,    I had the pleasure of seeing Allen Bills in the [facility].    HPI and Plan:   I had the pleasure of seeing Allen Bills today in cardiology clinic follow up. He is a pleasant 53 year old patient of Dr. Santos with a history of coronary artery disease based on CT calcium score is and CT coronary angiography.    Mr. Bills has a family history of premature coronary artery disease and has concern based on his CT calcium score done in 2011 about progression of his disease.  He therefore sought Dr. Santos's recommendations.  Previous stress echocardiogram a year ago was negative for ischemia, the EKG portion of the test was also negative.    He was recommended to undergo a CT coronary angiogram which we reviewed today.  According to his calcium score he has had some increase in calcium specifically in the LAD artery has score went from 169 up to 205 and in the right coronary artery the score went from 48 up to 173.His calcium score puts him in the 95th percentile compared to age and gender matched control group.    Looking at his CT coronary angiogram he has minimal disease in the left main.  His LAD has mild to moderate proximal narrowing with eccentric calcified plaque just beyond the bifurcation, there is also mild proximal narrowing and a second diagonal branch with calcified plaque.  His circumflex has no detectable stenosis or plaque.  His right coronary artery has mild proximal narrowing with calcified plaque followed by a mild to moderate mid vessel narrowing with calcified plaque in the mid distal narrowing with calcified plaque.    Today Mr. Bills tells me he is doing well.  He does not think he has any chest pain.  Sometimes when he is lifting weights he feels some pain in his arm it sounds like it short-lived and more likely to must be  musculoskeletal.  He walks daily without any chest neck or jaw pain or shortness of breath.  He does wear a fit bed and notices when he first starts walking that he will get some faster heart rates but the subsided he has no associated symptoms with them.    Labs Reviewed:  total cholesterol 120, HDL 46, LDL 62, triglycerides 58     Physical Exam  Please see Below     Assessment and Plan  1.  Coronary artery disease.  His CT coronary angiogram shows mild to moderate disease in his LAD   And his right coronary artery.  He had a normal stress echocardiogram a year ago.  He is not having any obvious ischemic symptoms.  He wants to optimize his risk factors.  He knows he should lose weight but this is difficult for him to do.  He is on simvastatin, but brought up the question of if he should be on a stronger statin.  I did go ahead and switch him to Lipitor 20, we will repeat cholesterol numbers in 3 months.  His blood pressure is well controlled.  He does not need further cardiac evaluation at this time.  He could have a repeat stress echo in a few years.  2.  Palpitations.  He says when he first starts walking he notices a faster heart rate, he wears a fit bit and says it goes up to 170 but then it goes down he does not have any associated symptoms with this.  I did offer to do a monitor for him, at this time he does not want to wear one.    Thank you for allowing me to care for Allen Bills today.    BRENDEN Chauhan, CNP  Cardiology    Voice recognition software was used for this note, I have reviewed this note, but errors may have been missed.    Orders Placed This Encounter   Procedures     Lipid Profile     ALT     Orders Placed This Encounter   Medications     DISCONTD: atorvastatin (LIPITOR) 10 MG tablet     Sig: Take 1 tablet (10 mg) by mouth daily     Dispense:  90 tablet     Refill:  3     atorvastatin (LIPITOR) 20 MG tablet     Sig: Take 1 tablet (20 mg) by mouth daily     Dispense:  90 tablet      Refill:  3     Medications Discontinued During This Encounter   Medication Reason     simvastatin (ZOCOR) 20 MG tablet      simvastatin (ZOCOR) 20 MG tablet      atorvastatin (LIPITOR) 10 MG tablet Reorder         CURRENT MEDICATIONS:  Current Outpatient Prescriptions   Medication Sig Dispense Refill     amLODIPine (NORVASC) 5 MG tablet Take 1 tablet (5 mg) by mouth 2 times daily 180 tablet 3     aspirin 81 MG tablet Take 1 tablet by mouth daily.       atorvastatin (LIPITOR) 20 MG tablet Take 1 tablet (20 mg) by mouth daily 90 tablet 3     hydrochlorothiazide (HYDRODIURIL) 25 MG tablet Take 1 tablet (25 mg) by mouth daily 90 tablet 3     levETIRAcetam (KEPPRA) 500 MG tablet TAKE 3 TABS BY MOUTH TWO TIMES DAILY 540 tablet 3     loratadine (CLARITIN) 10 MG capsule Take 10 mg by mouth daily.       Omega-3 Fatty Acids (FISH OIL) 1200 MG capsule Take 1,200 mg by mouth daily       [DISCONTINUED] atorvastatin (LIPITOR) 10 MG tablet Take 1 tablet (10 mg) by mouth daily 90 tablet 3       ALLERGIES   No Known Allergies    PAST MEDICAL HISTORY:  Past Medical History:   Diagnosis Date     AVM (arteriovenous malformation) brain 2005    surgery [place]     Elevated serum creatinine      Flank pain 04/2017    ct abd and pelvis nl     H/O colonoscopy 4/16    int hem otherwise nl     Hearing loss in left ear      HTN (hypertension) 2011    added norvasc 6/16     Nephrolithiasis 2005 and 2006    Dr. Chaparro     Normal echocardiogram 2006     Normal stress echocardiogram 2006, 2015, 8/17    nl     Numbness 2006    mri neg     Palpitations 2011    est echo nl [place] [facility] 8/31/11     Screening 2011    ebct score 218, seen by Dr. Jimenez 2011     Seizure (H) 2006    right sided numbness, Dr. Rosales     Tinnitus of left ear 2005       PAST SURGICAL HISTORY:  Past Surgical History:   Procedure Laterality Date     C BRAIN AVM SURG,DURAL,COMPLX  2005    [place]     LITHOTRIPSY  2005       FAMILY HISTORY:  Family History   Problem Relation Age  "of Onset     Diabetes Father      Hypertension Father      Myocardial Infarction Father 68     Breast Cancer Sister        SOCIAL HISTORY:  Social History     Social History     Marital status:      Spouse name: N/A     Number of children: 3     Years of education: N/A     Occupational History     marketing and sales      Social History Main Topics     Smoking status: Never Smoker     Smokeless tobacco: Never Used     Alcohol use 0.0 oz/week     0 Standard drinks or equivalent per week      Comment: 2 drinks monthly     Drug use: No     Sexual activity: Yes     Partners: Female     Other Topics Concern     None     Social History Narrative       Review of Systems:  Skin:  Negative       Eyes:  Negative      ENT:  Positive for hearing loss;tinnitus    Respiratory:  Negative       Cardiovascular:    Positive for;lightheadedness;palpitations feels skipped beats with increased HR  Gastroenterology: Negative      Genitourinary:  Negative      Musculoskeletal:  Positive for joint pain    Neurologic:  Positive for seizures;numbness or tingling of hands    Psychiatric:  Positive for sleep disturbances    Heme/Lymph/Imm:  Positive for allergies    Endocrine:  Negative        Physical Exam:  Vitals: /60  Pulse 64  Ht 1.651 m (5' 5\")  Wt 88.2 kg (194 lb 6.4 oz)  BMI 32.35 kg/m2    Constitutional:  cooperative;well developed        Skin:  warm and dry to the touch          Head:  normocephalic        Eyes:  pupils equal and round        Lymph:No Cervical lymphadenopathy present     ENT:  no pallor or cyanosis, dentition good        Neck:  JVP normal        Respiratory:  clear to auscultation         Cardiac: regular rhythm;normal S1 and S2     no presence of murmur          not assessed this visit                                        GI:  not assessed this visit        Extremities and Muscular Skeletal:  no edema              Neurological:  no gross motor deficits        Psych:  Alert and Oriented x 3  "   Encounter Diagnosis   Name Primary?     Coronary artery disease due to calcified coronary lesion        Recent Lab Results:  LIPID RESULTS:  Lab Results   Component Value Date    CHOL 120 08/24/2018    HDL 46 08/24/2018    LDL 62 08/24/2018    TRIG 58 08/24/2018    CHOLHDLRATIO 2.6 03/20/2015       LIVER ENZYME RESULTS:  Lab Results   Component Value Date    AST 29 08/15/2017    ALT 48 08/15/2017       CBC RESULTS:  Lab Results   Component Value Date    WBC 7.5 08/15/2017    RBC 4.66 08/15/2017    HGB 14.6 08/15/2017    HCT 43.1 08/15/2017    MCV 93 08/15/2017    MCH 31.3 08/15/2017    MCHC 33.9 08/15/2017    RDW 12.7 08/15/2017     08/15/2017       BMP RESULTS:  Lab Results   Component Value Date     06/25/2018    POTASSIUM 3.5 07/09/2018    CHLORIDE 102 06/25/2018    CO2 25 06/25/2018    ANIONGAP 12 06/25/2018    GLC 79 06/25/2018    BUN 22 06/25/2018    CR 1.20 06/25/2018    GFRESTIMATED 63 06/25/2018    GFRESTBLACK 76 06/25/2018    GLADYS 9.2 06/25/2018        A1C RESULTS:  No results found for: A1C    INR RESULTS:  Lab Results   Component Value Date    INR 0.92 08/01/2005       Thank you for allowing me to participate in the care of your patient.    Sincerely,     BRENDEN Ewing CNP     [facility]

## 2018-08-24 NOTE — MR AVS SNAPSHOT
"              After Visit Summary   8/24/2018    Allen Bills    MRN: [id number]           Patient Information     Date Of Birth          1965        Visit Information        Provider Department      8/24/2018 9:00 AM Janee Alas APRN CNP [facility]        Today's Diagnoses     Coronary artery disease due to calcified coronary lesion           Follow-ups after your visit        Future tests that were ordered for you today     Open Future Orders        Priority Expected Expires Ordered    Lipid Profile Routine 11/22/2018 8/24/2019 8/24/2018    ALT Routine 11/22/2018 8/24/2019 8/24/2018            Who to contact     If you have questions or need follow up information about today's clinic visit or your schedule please contact [facility] directly at [phone].  Normal or non-critical lab and imaging results will be communicated to you by MyChart, letter or phone within 4 business days after the clinic has received the results. If you do not hear from us within 7 days, please contact the clinic through MyChart or phone. If you have a critical or abnormal lab result, we will notify you by phone as soon as possible.  Submit refill requests through [employer] or call your pharmacy and they will forward the refill request to us. Please allow 3 business days for your refill to be completed.          Additional Information About Your Visit        Care EveryWhere ID     This is your Care EveryWhere ID. This could be used by other organizations to access your [facility] medical records  [id number]        Your Vitals Were     Pulse Height BMI (Body Mass Index)             64 1.651 m (5' 5\") 32.35 kg/m2          Blood Pressure from Last 3 Encounters:   08/24/18 110/60   08/07/18 122/74   06/25/18 127/65    Weight from Last 3 Encounters:   08/24/18 88.2 kg (194 lb 6.4 oz)   08/07/18 91.7 kg (202 lb 3.2 oz)   06/25/18 91.4 kg " (201 lb 6.4 oz)              We Performed the Following     Follow-Up with Cardiac Advanced Practice Provider          Today's Medication Changes          These changes are accurate as of 8/24/18 10:03 AM.  If you have any questions, ask your nurse or doctor.               Start taking these medicines.        Dose/Directions    atorvastatin 20 MG tablet   Commonly known as:  LIPITOR   Used for:  Coronary artery disease due to calcified coronary lesion   Started by:  Janee Alas APRN CNP        Dose:  20 mg   Take 1 tablet (20 mg) by mouth daily   Quantity:  90 tablet   Refills:  3         Stop taking these medicines if you haven't already. Please contact your care team if you have questions.     simvastatin 20 MG tablet   Commonly known as:  ZOCOR   Stopped by:  Janee Alas APRN CNP                Where to get your medicines      These medications were sent to [facility] [id number] [place] [facility] [place], [place] - [address]  [address], [place] [address]     Phone:  [phone]     atorvastatin 20 MG tablet                Primary Care Provider Office Phone # Fax #    Hossein Franco Farah [phone] [address]  [facility] [address]        Equal Access to Services     [facility]: Hadii lalo arce hadtanyao Sorufina, waaxda luqadaha, qaybta kaalmada adeegyada, yolanda patino. So [facility] [place] [phone].    ATENCIÓN: Si habla español, tiene a hernandez disposición servicios gratuitos de asistencia lingüística. [facility] [phone].    We comply with applicable federal civil rights laws and [place] laws. We do not discriminate on the basis of race, color, national origin, age, disability, sex, sexual orientation, or gender identity.            Thank you!     Thank you for choosing [facility]  for your care. Our goal is always to provide you with excellent care. Hearing back from our patients is  one way we can continue to improve our services. Please take a few minutes to complete the written survey that you may receive in the mail after your visit with us. Thank you!             Your Updated Medication List - Protect others around you: Learn how to safely use, store and throw away your medicines at www.disposemymeds.org.          This list is accurate as of 8/24/18 10:03 AM.  Always use your most recent med list.                   Brand Name Dispense Instructions for use Diagnosis    amLODIPine 5 MG tablet    NORVASC    180 tablet    Take 1 tablet (5 mg) by mouth 2 times daily    Benign essential hypertension       aspirin 81 MG tablet      Take 1 tablet by mouth daily.        atorvastatin 20 MG tablet    LIPITOR    90 tablet    Take 1 tablet (20 mg) by mouth daily    Coronary artery disease due to calcified coronary lesion       CLARITIN 10 MG capsule   Generic drug:  loratadine      Take 10 mg by mouth daily.        fish Oil 1200 MG capsule      Take 1,200 mg by mouth daily        hydrochlorothiazide 25 MG tablet    HYDRODIURIL    90 tablet    Take 1 tablet (25 mg) by mouth daily    Benign essential hypertension       levETIRAcetam 500 MG tablet    KEPPRA    540 tablet    TAKE 3 TABS BY MOUTH TWO TIMES DAILY    Convulsions, unspecified convulsion type (H)

## 2018-11-16 ENCOUNTER — TELEPHONE (OUTPATIENT)
Dept: CARDIOLOGY | Facility: CLINIC | Age: 53
End: 2018-11-16

## 2018-11-16 DIAGNOSIS — I25.84 CORONARY ARTERY DISEASE DUE TO CALCIFIED CORONARY LESION: ICD-10-CM

## 2018-11-16 DIAGNOSIS — I25.10 CORONARY ARTERY DISEASE DUE TO CALCIFIED CORONARY LESION: ICD-10-CM

## 2018-11-16 LAB
ALT SERPL W P-5'-P-CCNC: 15 U/L (ref 5–30)
CHOLEST SERPL-MCNC: 111 MG/DL
HDLC SERPL-MCNC: 46 MG/DL
LDLC SERPL CALC-MCNC: 55 MG/DL
NONHDLC SERPL-MCNC: 65 MG/DL
TRIGL SERPL-MCNC: 49 MG/DL

## 2018-11-16 PROCEDURE — 84460 ALANINE AMINO (ALT) (SGPT): CPT | Performed by: NURSE PRACTITIONER

## 2018-11-16 PROCEDURE — 36415 COLL VENOUS BLD VENIPUNCTURE: CPT | Performed by: NURSE PRACTITIONER

## 2018-11-16 PROCEDURE — 80061 LIPID PANEL: CPT | Performed by: NURSE PRACTITIONER

## 2018-11-16 NOTE — LETTER
November 16, 2018       TO: Allen Bills   [address] [place] [address]       Dear Mr. Bills,    The results of your recent labwork are below.    Results for orders placed or performed in visit on 11/16/18   Lipid Profile   Result Value Ref Range    Cholesterol 111 <200 mg/dL    Triglycerides 49 <150 mg/dL    HDL Cholesterol 46 >39 mg/dL    LDL Cholesterol Calculated 55 <100 mg/dL    Non HDL Cholesterol 65 <130 mg/dL   ALT   Result Value Ref Range    ALT 15 5 - 30 U/L     Betsey states that your labs look great! She would like you to continue your current medications.   Please let us know if you have any further questions.      Sincerely,    [facility]  [phone]  [facility]

## 2018-11-16 NOTE — TELEPHONE ENCOUNTER
Notes Recorded by Janee Alas, BRENDEN CNP on 11/16/2018 at 12:28 PM  Labs look great, please send copy to pt. Thanks, Betsey    Results letter mailed to patient with Betsey's comments.

## 2018-11-29 DIAGNOSIS — I10 BENIGN ESSENTIAL HYPERTENSION: ICD-10-CM

## 2018-11-29 NOTE — TELEPHONE ENCOUNTER
"  hydrochlorothiazide (HYDRODIURIL) 25 MG tablet 90 tablet 3 9/11/2017       Last Written Prescription Date:  09/11/2017  Last Fill Quantity: 90,  # refills: 3   Last office visit: 6/25/2018 with prescribing provider:     Future Office Visit:  Unknown    Requested Prescriptions   Pending Prescriptions Disp Refills     hydrochlorothiazide (HYDRODIURIL) 25 MG tablet [Pharmacy Med Name: HYDROCHLOROTHIAZIDE 25 MG TAB] 90 tablet 3     Sig: TAKE 1 TABLET (25 MG) BY MOUTH DAILY    Diuretics (Including Combos) Protocol Passed    11/29/2018 10:59 AM       Passed - Blood pressure under 140/90 in past 12 months    BP Readings from Last 3 Encounters:   08/24/18 110/60   08/07/18 122/74   06/25/18 127/65                Passed - Recent (12 mo) or future (30 days) visit within the authorizing provider's specialty    Patient had office visit in the last 12 months or has a visit in the next 30 days with authorizing provider or within the authorizing provider's specialty.  See \"Patient Info\" tab in inbasket, or \"Choose Columns\" in Meds & Orders section of the refill encounter.             Passed - Patient is age 18 or older       Passed - Normal serum creatinine on file in past 12 months    Recent Labs   Lab Test  06/25/18   1404   CR  1.20             Passed - Normal serum potassium on file in past 12 months    Recent Labs   Lab Test  07/09/18   1008   POTASSIUM  3.5                   Passed - Normal serum sodium on file in past 12 months    Recent Labs   Lab Test  06/25/18   1404   NA  139                "

## 2018-12-03 DIAGNOSIS — R56.9 CONVULSIONS, UNSPECIFIED CONVULSION TYPE (H): ICD-10-CM

## 2018-12-03 DIAGNOSIS — I25.84 CORONARY ARTERY DISEASE DUE TO CALCIFIED CORONARY LESION: ICD-10-CM

## 2018-12-03 DIAGNOSIS — I25.10 CORONARY ARTERY DISEASE DUE TO CALCIFIED CORONARY LESION: ICD-10-CM

## 2018-12-03 DIAGNOSIS — I10 BENIGN ESSENTIAL HYPERTENSION: ICD-10-CM

## 2018-12-03 RX ORDER — LEVETIRACETAM 500 MG/1
TABLET ORAL
Qty: 540 TABLET | Refills: 3 | Status: SHIPPED | OUTPATIENT
Start: 2018-12-03 | End: 2019-10-29

## 2018-12-03 RX ORDER — AMLODIPINE BESYLATE 5 MG/1
5 TABLET ORAL 2 TIMES DAILY
Qty: 180 TABLET | Refills: 3 | Status: SHIPPED | OUTPATIENT
Start: 2018-12-03 | End: 2019-10-29

## 2018-12-03 RX ORDER — HYDROCHLOROTHIAZIDE 25 MG/1
TABLET ORAL
Qty: 90 TABLET | Refills: 3 | Status: SHIPPED | OUTPATIENT
Start: 2018-12-03 | End: 2019-10-29

## 2018-12-03 RX ORDER — HYDROCHLOROTHIAZIDE 25 MG/1
TABLET ORAL
Qty: 90 TABLET | Refills: 1 | Status: SHIPPED | OUTPATIENT
Start: 2018-12-03 | End: 2018-12-03

## 2018-12-03 RX ORDER — ATORVASTATIN CALCIUM 20 MG/1
20 TABLET, FILM COATED ORAL DAILY
Qty: 90 TABLET | Refills: 3 | Status: SHIPPED | OUTPATIENT
Start: 2018-12-03 | End: 2019-08-16

## 2018-12-04 DIAGNOSIS — I10 BENIGN ESSENTIAL HYPERTENSION: ICD-10-CM

## 2018-12-04 NOTE — TELEPHONE ENCOUNTER
"Requested Prescriptions   Pending Prescriptions Disp Refills     amLODIPine (NORVASC) 5 MG tablet [Pharmacy Med Name: AMLODIPINE BESYLATE 5 MG TAB]  Last Written Prescription Date:  12/3/2018  Last Fill Quantity: 180,  # refills: 3   Last office visit: 6/25/2018 with prescribing provider:  RUFINO   Future Office Visit:     90 tablet 3     Sig: TAKE 1 TABLET (5 MG) BY MOUTH DAILY    Calcium Channel Blockers Protocol  Passed    12/4/2018 10:03 AM       Passed - Blood pressure under 140/90 in past 12 months    BP Readings from Last 3 Encounters:   08/24/18 110/60   08/07/18 122/74   06/25/18 127/65                Passed - Recent (12 mo) or future (30 days) visit within the authorizing provider's specialty    Patient had office visit in the last 12 months or has a visit in the next 30 days with authorizing provider or within the authorizing provider's specialty.  See \"Patient Info\" tab in inbasket, or \"Choose Columns\" in Meds & Orders section of the refill encounter.             Passed - Patient is age 18 or older       Passed - Normal serum creatinine on file in past 12 months    Recent Labs   Lab Test  06/25/18   1404   CR  1.20               "

## 2018-12-05 RX ORDER — AMLODIPINE BESYLATE 5 MG/1
TABLET ORAL
Refills: 0
Start: 2018-12-05

## 2019-07-18 ENCOUNTER — TRANSFERRED RECORDS (OUTPATIENT)
Dept: HEALTH INFORMATION MANAGEMENT | Facility: CLINIC | Age: 54
End: 2019-07-18

## 2019-08-16 DIAGNOSIS — I25.10 CORONARY ARTERY DISEASE DUE TO CALCIFIED CORONARY LESION: ICD-10-CM

## 2019-08-16 DIAGNOSIS — I25.84 CORONARY ARTERY DISEASE DUE TO CALCIFIED CORONARY LESION: ICD-10-CM

## 2019-08-16 RX ORDER — ATORVASTATIN CALCIUM 20 MG/1
20 TABLET, FILM COATED ORAL DAILY
Qty: 90 TABLET | Refills: 0 | Status: SHIPPED | OUTPATIENT
Start: 2019-08-16 | End: 2019-10-29

## 2019-09-06 ENCOUNTER — TELEPHONE (OUTPATIENT)
Dept: CARDIOLOGY | Facility: CLINIC | Age: 54
End: 2019-09-06

## 2019-09-06 NOTE — TELEPHONE ENCOUNTER
Pt called in he will F/U with PMD for medications, and see cardiology if he has issue. JOHANNE Carr RN

## 2019-10-29 ENCOUNTER — OFFICE VISIT (OUTPATIENT)
Dept: FAMILY MEDICINE | Facility: CLINIC | Age: 54
End: 2019-10-29
Payer: COMMERCIAL

## 2019-10-29 VITALS
WEIGHT: 186 LBS | OXYGEN SATURATION: 100 % | HEIGHT: 65 IN | SYSTOLIC BLOOD PRESSURE: 120 MMHG | HEART RATE: 58 BPM | DIASTOLIC BLOOD PRESSURE: 63 MMHG | TEMPERATURE: 97.5 F | BODY MASS INDEX: 30.99 KG/M2

## 2019-10-29 DIAGNOSIS — I10 BENIGN ESSENTIAL HYPERTENSION: ICD-10-CM

## 2019-10-29 DIAGNOSIS — Z23 NEED FOR PROPHYLACTIC VACCINATION AND INOCULATION AGAINST INFLUENZA: ICD-10-CM

## 2019-10-29 DIAGNOSIS — R56.9 CONVULSIONS, UNSPECIFIED CONVULSION TYPE (H): ICD-10-CM

## 2019-10-29 DIAGNOSIS — Z00.00 ROUTINE GENERAL MEDICAL EXAMINATION AT A HEALTH CARE FACILITY: Primary | ICD-10-CM

## 2019-10-29 DIAGNOSIS — R79.89 ELEVATED SERUM CREATININE: ICD-10-CM

## 2019-10-29 DIAGNOSIS — I25.10 CORONARY ARTERY DISEASE DUE TO CALCIFIED CORONARY LESION: ICD-10-CM

## 2019-10-29 DIAGNOSIS — E78.5 HYPERLIPIDEMIA LDL GOAL <100: ICD-10-CM

## 2019-10-29 DIAGNOSIS — R56.9 SEIZURE (H): ICD-10-CM

## 2019-10-29 DIAGNOSIS — N52.9 MALE ERECTILE DISORDER: ICD-10-CM

## 2019-10-29 DIAGNOSIS — I25.84 CORONARY ARTERY DISEASE DUE TO CALCIFIED CORONARY LESION: ICD-10-CM

## 2019-10-29 LAB
ALBUMIN SERPL-MCNC: 4.1 G/DL (ref 3.4–5)
ALBUMIN UR-MCNC: NEGATIVE MG/DL
ALP SERPL-CCNC: 66 U/L (ref 40–150)
ALT SERPL W P-5'-P-CCNC: 56 U/L (ref 0–70)
ANION GAP SERPL CALCULATED.3IONS-SCNC: 7 MMOL/L (ref 3–14)
APPEARANCE UR: CLEAR
AST SERPL W P-5'-P-CCNC: 33 U/L (ref 0–45)
BILIRUB SERPL-MCNC: 0.7 MG/DL (ref 0.2–1.3)
BILIRUB UR QL STRIP: NEGATIVE
BUN SERPL-MCNC: 25 MG/DL (ref 7–30)
CALCIUM SERPL-MCNC: 8.8 MG/DL (ref 8.5–10.1)
CHLORIDE SERPL-SCNC: 103 MMOL/L (ref 94–109)
CHOLEST SERPL-MCNC: 126 MG/DL
CO2 SERPL-SCNC: 28 MMOL/L (ref 20–32)
COLOR UR AUTO: YELLOW
CREAT SERPL-MCNC: 1.23 MG/DL (ref 0.66–1.25)
ERYTHROCYTE [DISTWIDTH] IN BLOOD BY AUTOMATED COUNT: 12.4 % (ref 10–15)
GFR SERPL CREATININE-BSD FRML MDRD: 65 ML/MIN/{1.73_M2}
GLUCOSE SERPL-MCNC: 102 MG/DL (ref 70–99)
GLUCOSE UR STRIP-MCNC: NEGATIVE MG/DL
HCT VFR BLD AUTO: 44.4 % (ref 40–53)
HDLC SERPL-MCNC: 53 MG/DL
HGB BLD-MCNC: 15.1 G/DL (ref 13.3–17.7)
HGB UR QL STRIP: ABNORMAL
KETONES UR STRIP-MCNC: NEGATIVE MG/DL
LDLC SERPL CALC-MCNC: 62 MG/DL
LEUKOCYTE ESTERASE UR QL STRIP: NEGATIVE
MCH RBC QN AUTO: 32.1 PG (ref 26.5–33)
MCHC RBC AUTO-ENTMCNC: 34 G/DL (ref 31.5–36.5)
MCV RBC AUTO: 95 FL (ref 78–100)
NITRATE UR QL: NEGATIVE
NONHDLC SERPL-MCNC: 73 MG/DL
PH UR STRIP: 7 PH (ref 5–7)
PLATELET # BLD AUTO: 233 10E9/L (ref 150–450)
POTASSIUM SERPL-SCNC: 3.8 MMOL/L (ref 3.4–5.3)
PROT SERPL-MCNC: 7.4 G/DL (ref 6.8–8.8)
PSA SERPL-ACNC: 0.91 UG/L (ref 0–4)
RBC # BLD AUTO: 4.7 10E12/L (ref 4.4–5.9)
RBC #/AREA URNS AUTO: NORMAL /HPF
SODIUM SERPL-SCNC: 138 MMOL/L (ref 133–144)
SOURCE: ABNORMAL
SP GR UR STRIP: 1.02 (ref 1–1.03)
TRIGL SERPL-MCNC: 54 MG/DL
TSH SERPL DL<=0.005 MIU/L-ACNC: 3.52 MU/L (ref 0.4–4)
UROBILINOGEN UR STRIP-ACNC: 0.2 EU/DL (ref 0.2–1)
WBC # BLD AUTO: 7 10E9/L (ref 4–11)
WBC #/AREA URNS AUTO: NORMAL /HPF

## 2019-10-29 PROCEDURE — 84443 ASSAY THYROID STIM HORMONE: CPT | Performed by: INTERNAL MEDICINE

## 2019-10-29 PROCEDURE — 90714 TD VACC NO PRESV 7 YRS+ IM: CPT | Performed by: INTERNAL MEDICINE

## 2019-10-29 PROCEDURE — 90472 IMMUNIZATION ADMIN EACH ADD: CPT | Performed by: INTERNAL MEDICINE

## 2019-10-29 PROCEDURE — 80061 LIPID PANEL: CPT | Performed by: INTERNAL MEDICINE

## 2019-10-29 PROCEDURE — 80053 COMPREHEN METABOLIC PANEL: CPT | Performed by: INTERNAL MEDICINE

## 2019-10-29 PROCEDURE — G0103 PSA SCREENING: HCPCS | Performed by: INTERNAL MEDICINE

## 2019-10-29 PROCEDURE — 90471 IMMUNIZATION ADMIN: CPT | Performed by: INTERNAL MEDICINE

## 2019-10-29 PROCEDURE — 84403 ASSAY OF TOTAL TESTOSTERONE: CPT | Performed by: INTERNAL MEDICINE

## 2019-10-29 PROCEDURE — 85027 COMPLETE CBC AUTOMATED: CPT | Performed by: INTERNAL MEDICINE

## 2019-10-29 PROCEDURE — 36415 COLL VENOUS BLD VENIPUNCTURE: CPT | Performed by: INTERNAL MEDICINE

## 2019-10-29 PROCEDURE — 99396 PREV VISIT EST AGE 40-64: CPT | Performed by: INTERNAL MEDICINE

## 2019-10-29 PROCEDURE — 81001 URINALYSIS AUTO W/SCOPE: CPT | Performed by: INTERNAL MEDICINE

## 2019-10-29 PROCEDURE — 90682 RIV4 VACC RECOMBINANT DNA IM: CPT | Performed by: INTERNAL MEDICINE

## 2019-10-29 RX ORDER — AMLODIPINE BESYLATE 5 MG/1
5 TABLET ORAL 2 TIMES DAILY
Qty: 180 TABLET | Refills: 3 | Status: SHIPPED | OUTPATIENT
Start: 2019-10-29 | End: 2020-02-21

## 2019-10-29 RX ORDER — ATORVASTATIN CALCIUM 20 MG/1
20 TABLET, FILM COATED ORAL DAILY
Qty: 90 TABLET | Refills: 3 | Status: SHIPPED | OUTPATIENT
Start: 2019-10-29 | End: 2020-02-21

## 2019-10-29 RX ORDER — HYDROCHLOROTHIAZIDE 25 MG/1
TABLET ORAL
Qty: 90 TABLET | Refills: 3 | Status: SHIPPED | OUTPATIENT
Start: 2019-10-29 | End: 2020-02-21

## 2019-10-29 RX ORDER — LEVETIRACETAM 500 MG/1
TABLET ORAL
Qty: 540 TABLET | Refills: 3 | Status: SHIPPED | OUTPATIENT
Start: 2019-10-29 | End: 2020-02-21

## 2019-10-29 RX ORDER — SILDENAFIL 100 MG/1
100 TABLET, FILM COATED ORAL DAILY PRN
Qty: 5 TABLET | Refills: 11 | Status: SHIPPED | OUTPATIENT
Start: 2019-10-29 | End: 2019-11-06

## 2019-10-29 ASSESSMENT — MIFFLIN-ST. JEOR: SCORE: 1600.57

## 2019-10-29 NOTE — PROGRESS NOTES
SUBJECTIVE:   CC: Allen iBlls is an 54 year old male who presents for preventative health visit.     The patient overall is doing very well and works out regularly.  His weight is down as noted.    He has had some erectile dysfunction issues over the last 6 to 12 months.  He wonders if it could be the twice daily dose of Norvasc.  It can be hard to get or maintain.    Occasionally he will have a pain in the left neck.  It comes and goes for the last 6 to 12 months.  There is no correlation with any activities.    He also occasionally have a discomfort near the left shoulder that goes down the left arm for the last 6 to 12 months.  It is not exertional and he can exert in a heavy workload without any difficulty.  It comes and goes.    He otherwise feels well.  No seizures.    Healthy Habits:     Getting at least 3 servings of Calcium per day:  NO    Bi-annual eye exam:  NO    Dental care twice a year:  NO    Sleep apnea or symptoms of sleep apnea:  None    Diet:  Regular (no restrictions)    Frequency of exercise:  6-7 days/week    Duration of exercise:  45-60 minutes    Taking medications regularly:  Yes    Barriers to taking medications:  None    Medication side effects:  None    PHQ-2 Total Score: 0    Additional concerns today:  No              Today's PHQ-2 Score:   PHQ-2 ( 1999 Pfizer) 8/15/2017   Q1: Little interest or pleasure in doing things 0   Q2: Feeling down, depressed or hopeless 0   PHQ-2 Score 0       Abuse: Current or Past(Physical, Sexual or Emotional)- No  Do you feel safe in your environment? Yes               Past Medical History:      Past Medical History:   Diagnosis Date     AVM (arteriovenous malformation) brain 2005    surgery [place]     Elevated serum creatinine      Flank pain 04/2017    ct abd and pelvis nl     H/O colonoscopy 4/16    int hem otherwise nl     Hearing loss in left ear      HTN (hypertension) 2011    added norvasc 6/16     Nephrolithiasis 2005 and 2006      [facility]     Normal echocardiogram 2006     Normal stress echocardiogram 2006, 2015, 8/17    nl     Numbness 2006    mri neg     Palpitations 2011    est echo nl [place] [facility] 8/31/11     Screening 2011, 2018    ebct score 218 in 2018, seen by Dr. Jimenez 2011; cta done 8/18 with some cad and higher calcium score     Seizure (H) 2006    right sided numbness, Dr. Rosales     Tinnitus of left ear 2005             Past Surgical History:      Past Surgical History:   Procedure Laterality Date     C BRAIN AVM SURG,DURAL,COMPLX  2005    [place]     LITHOTRIPSY  2005             Social History:     Social History     Socioeconomic History     Marital status:      Spouse name: Not on file     Number of children: 3     Years of education: Not on file     Highest education level: Not on file   Occupational History     Occupation: marketing and sales   Social Needs     Financial resource strain: Not on file     Food insecurity:     Worry: Not on file     Inability: Not on file     Transportation needs:     Medical: Not on file     Non-medical: Not on file   Tobacco Use     Smoking status: Never Smoker     Smokeless tobacco: Never Used   Substance and Sexual Activity     Alcohol use: Yes     Alcohol/week: 0.0 standard drinks     Comment: 2 drinks monthly     Drug use: No     Sexual activity: Yes     Partners: Female   Lifestyle     Physical activity:     Days per week: Not on file     Minutes per session: Not on file     Stress: Not on file   Relationships     Social connections:     Talks on phone: Not on file     Gets together: Not on file     Attends [employer]andaen service: Not on file     Active member of club or organization: Not on file     Attends meetings of clubs or organizations: Not on file     Relationship status: Not on file     Intimate partner violence:     Fear of current or ex partner: Not on file     Emotionally abused: Not on file     Physically abused: Not on file     Forced sexual activity: Not on file  "  Other Topics Concern     Parent/sibling w/ CABG, MI or angioplasty before 65F 55M? Not Asked   Social History Narrative     Not on file             Family History:   reviewed         Allergies:   No Known Allergies          Medications:     Current Outpatient Medications   Medication Sig Dispense Refill     amLODIPine (NORVASC) 5 MG tablet Take 1 tablet (5 mg) by mouth 2 times daily 180 tablet 3     aspirin 81 MG tablet Take 1 tablet by mouth daily.       atorvastatin (LIPITOR) 20 MG tablet Take 1 tablet (20 mg) by mouth daily 90 tablet 3     hydrochlorothiazide (HYDRODIURIL) 25 MG tablet TAKE 1 TABLET (25 MG) BY MOUTH DAILY 90 tablet 3     levETIRAcetam (KEPPRA) 500 MG tablet TAKE 3 TABS BY MOUTH TWO TIMES DAILY 540 tablet 3     loratadine (CLARITIN) 10 MG capsule Take 10 mg by mouth daily.       Omega-3 Fatty Acids (FISH OIL) 1200 MG capsule Take 1,200 mg by mouth daily       sildenafil (VIAGRA) 100 MG tablet Take 1 tablet (100 mg) by mouth daily as needed 5 tablet 11               Review of Systems:   The 10 point Review of Systems is negative other than noted in the HPI           Physical Exam:   Blood pressure 120/63, pulse 58, temperature 97.5  F (36.4  C), temperature source Tympanic, height 1.651 m (5' 5\"), weight 84.4 kg (186 lb), SpO2 100 %.    Exam:  Constitutional: healthy appearing, alert and in no distress  Heent: Normocephalic. Head without obvious masses or lesions. PERRLDC, EOMI. Mouth exam within normal limits: tongue, mucous membranes, posterior pharynx all normal, no lesions or abnormalities seen.  Tm's and canals within normal limits bilaterally. Neck supple, no nuchal rigidity or masses. No supraclavicular, or cervical adenopathy. Thyroid symmetric, no masses.  Cardiovascular: Regular rate and rhythm, no murmer, rub or gallops.  JVP not elevated, no edema.  Carotids within normal limits bilaterally, no bruits.  Respiratory: Normal respiratory effort.  Lungs clear, normal flow, no wheezing or " crackles.  Breasts: Normal bilaterally.  No masses or lesions.  Nipples within normal limites.  No axillary lesions or nodes.  Gastrointestinal: Normal active bowel sounds.   Soft, not tender, no masses, guarding or rebound.  No hepatosplenomegaly.   Genitourinary: Rectal not done  Musculoskeletal: extremities normal, no gross deformities noted.  Skin: no suspicious lesions or rashes   Neurologic: Mental status within normal limits.  Speech fluent.  No gross motor abnormalities and gait intact.  Psychiatric: mentation appears normal and affect normal.         Data:   Labs sent        Assessment:   1. Normal complete physical exam  2. Ascvd, doubt symptoms due to this, med mgmt  3. E.d, could be med but would expect more to be hydrochlorothiazide, he feels it may be norvasc  4. Elevated creat, follow up labs  5. Neck pain, suspect msk, doubt carotid, tumor, cv  6. Left arm pain, suspect msk, doubt above  7. Hypertension, controlled  8. Elevated cholesterol on statin  9. hcm         Plan:   Td  Flu shot  Letter with labs  Try change norvasc to just hs  Call update 2 weeks, if not better he wonders about claritin and could try off that, consider lower dose hydrochlorothiazide  Trial of viagra prn  Up to date pablo Farah M.D.

## 2019-10-29 NOTE — LETTER
[address]  [address]  [place]  [address]  Tel: [phone]    November 4, 2019    Allen Bills  [address]  [place] [address]        Dear Mr. Mercermorganjanelle,    The results of your recent  Testosterone was normal  If you have any further questions or problems, please contact our office.      Sincerely,    Hossein Farah MD/JEIMY          Enclosure: Lab Results  Results for orders placed or performed in visit on 10/29/19   CBC with platelets     Status: None   Result Value Ref Range    WBC 7.0 4.0 - 11.0 10e9/L    RBC Count 4.70 4.4 - 5.9 10e12/L    Hemoglobin 15.1 13.3 - 17.7 g/dL    Hematocrit 44.4 40.0 - 53.0 %    MCV 95 78 - 100 fl    MCH 32.1 26.5 - 33.0 pg    MCHC 34.0 31.5 - 36.5 g/dL    RDW 12.4 10.0 - 15.0 %    Platelet Count 233 150 - 450 10e9/L   *UA reflex to Microscopic     Status: Abnormal   Result Value Ref Range    Color Urine Yellow     Appearance Urine Clear     Glucose Urine Negative NEG^Negative mg/dL    Bilirubin Urine Negative NEG^Negative    Ketones Urine Negative NEG^Negative mg/dL    Specific Gravity Urine 1.025 1.003 - 1.035    Blood Urine Trace (A) NEG^Negative    pH Urine 7.0 5.0 - 7.0 pH    Protein Albumin Urine Negative NEG^Negative mg/dL    Urobilinogen Urine 0.2 0.2 - 1.0 EU/dL    Nitrite Urine Negative NEG^Negative    Leukocyte Esterase Urine Negative NEG^Negative    Source Midstream Urine    Comprehensive metabolic panel     Status: Abnormal   Result Value Ref Range    Sodium 138 133 - 144 mmol/L    Potassium 3.8 3.4 - 5.3 mmol/L    Chloride 103 94 - 109 mmol/L    Carbon Dioxide 28 20 - 32 mmol/L    Anion Gap 7 3 - 14 mmol/L    Glucose 102 (H) 70 - 99 mg/dL    Urea Nitrogen 25 7 - 30 mg/dL    Creatinine 1.23 0.66 - 1.25 mg/dL    GFR Estimate 65 >60 mL/min/[1.73_m2]    GFR Estimate If Black 75 >60 mL/min/[1.73_m2]    Calcium 8.8 8.5 - 10.1 mg/dL    Bilirubin Total 0.7 0.2 - 1.3 mg/dL    Albumin 4.1 3.4 - 5.0 g/dL    Protein Total 7.4 6.8 - 8.8  g/dL    Alkaline Phosphatase 66 40 - 150 U/L    ALT 56 0 - 70 U/L    AST 33 0 - 45 U/L   Lipid panel reflex to direct LDL Non-fasting     Status: None   Result Value Ref Range    Cholesterol 126 <200 mg/dL    Triglycerides 54 <150 mg/dL    HDL Cholesterol 53 >39 mg/dL    LDL Cholesterol Calculated 62 <100 mg/dL    Non HDL Cholesterol 73 <130 mg/dL   Prostate spec antigen screen     Status: None   Result Value Ref Range    PSA 0.91 0 - 4 ug/L   Testosterone total     Status: None   Result Value Ref Range    Testosterone Total 531 240 - 950 ng/dL   TSH with free T4 reflex     Status: None   Result Value Ref Range    TSH 3.52 0.40 - 4.00 mU/L   Urine Microscopic     Status: None   Result Value Ref Range    WBC Urine 0 - 5 OTO5^0 - 5 /HPF    RBC Urine O - 2 OTO2^O - 2 /HPF

## 2019-10-29 NOTE — NURSING NOTE
Clinic Administered Medication Documentation    MEDICATION LIST:   Injectable Medication Documentation    Patient was given TD. Prior to medication administration, verified patients identity using patient s name and date of birth. Please see MAR and medication order for additional information. Patient instructed to report any adverse reaction to staff immediately .      Was entire vial of medication used? Yes  Vial/Syringe: Syringe  Expiration Date:  4/19/21  Was this medication supplied by the patient? No     Prior to immunization administration, verified patients identity using patient s name and date of birth. Please see Immunization Activity for additional information.     Screening Questionnaire for Adult Immunization    Are you sick today?   No   Do you have allergies to medications, food, a vaccine component or latex?   No   Have you ever had a serious reaction after receiving a vaccination?   No   Do you have a long-term health problem with heart disease, lung disease, asthma, kidney disease, metabolic disease (e.g. diabetes), anemia, or other blood disorder?   No   Do you have cancer, leukemia, HIV/AIDS, or any other immune system problem?   No   In the past 3 months, have you taken medications that affect  your immune system, such as prednisone, other steroids, or anticancer drugs; drugs for the treatment of rheumatoid arthritis, Crohn s disease, or psoriasis; or have you had radiation treatments?   No   Have you had a seizure, or a brain or other nervous system problem?   No   During the past year, have you received a transfusion of blood or blood     products, or been given immune (gamma) globulin or antiviral drug?   No   For women: Are you pregnant or is there a chance you could become        pregnant during the next month?   No   Have you received any vaccinations in the past 4 weeks?   No     Immunization questionnaire answers were all negative.        Per orders of Dr. Farah, injection of TD Flu  shot given by Aleyda Grande CMA. Patient instructed to remain in clinic for 15 minutes afterwards, and to report any adverse reaction to me immediately.       Screening performed by Aleyda Grande CMA on 10/29/2019 at 9:13 AM.

## 2019-10-29 NOTE — PATIENT INSTRUCTIONS
Change the amlodipine to just at night and not in the morning.  Call me in 2 weeks with an update.    Try the viagra, take 1/2 to 1, 45 minutes prior.    I will have the heart clinic call you for the stress test    Hossein Farah M.D.

## 2019-10-31 LAB — TESTOST SERPL-MCNC: 531 NG/DL (ref 240–950)

## 2019-11-06 ENCOUNTER — HOSPITAL ENCOUNTER (OUTPATIENT)
Dept: CARDIOLOGY | Facility: CLINIC | Age: 54
Discharge: HOME | End: 2019-11-06
Attending: INTERNAL MEDICINE | Admitting: INTERNAL MEDICINE
Payer: COMMERCIAL

## 2019-11-06 DIAGNOSIS — I25.84 CORONARY ARTERY DISEASE DUE TO CALCIFIED CORONARY LESION: ICD-10-CM

## 2019-11-06 DIAGNOSIS — I25.10 CORONARY ARTERY DISEASE DUE TO CALCIFIED CORONARY LESION: ICD-10-CM

## 2019-11-06 DIAGNOSIS — N52.9 MALE ERECTILE DISORDER: ICD-10-CM

## 2019-11-06 PROCEDURE — 93350 STRESS TTE ONLY: CPT | Mod: 26 | Performed by: INTERNAL MEDICINE

## 2019-11-06 PROCEDURE — 93018 CV STRESS TEST I&R ONLY: CPT | Performed by: INTERNAL MEDICINE

## 2019-11-06 PROCEDURE — 93321 DOPPLER ECHO F-UP/LMTD STD: CPT | Mod: 26 | Performed by: INTERNAL MEDICINE

## 2019-11-06 PROCEDURE — 93325 DOPPLER ECHO COLOR FLOW MAPG: CPT | Mod: 26 | Performed by: INTERNAL MEDICINE

## 2019-11-06 PROCEDURE — 93016 CV STRESS TEST SUPVJ ONLY: CPT | Performed by: INTERNAL MEDICINE

## 2019-11-06 PROCEDURE — 93350 STRESS TTE ONLY: CPT | Mod: TC

## 2019-11-06 RX ORDER — SILDENAFIL 100 MG/1
100 TABLET, FILM COATED ORAL DAILY PRN
Qty: 5 TABLET | Refills: 11 | Status: SHIPPED | OUTPATIENT
Start: 2019-11-06 | End: 2020-04-30

## 2020-01-28 ENCOUNTER — TRANSFERRED RECORDS (OUTPATIENT)
Dept: HEALTH INFORMATION MANAGEMENT | Facility: CLINIC | Age: 55
End: 2020-01-28

## 2020-02-21 RX ORDER — ATORVASTATIN CALCIUM 20 MG/1
20 TABLET, FILM COATED ORAL DAILY
Qty: 90 TABLET | Refills: 3 | Status: SHIPPED | OUTPATIENT
Start: 2020-02-21 | End: 2021-03-17

## 2020-02-21 RX ORDER — AMLODIPINE BESYLATE 5 MG/1
5 TABLET ORAL 2 TIMES DAILY
Qty: 180 TABLET | Refills: 3 | Status: SHIPPED | OUTPATIENT
Start: 2020-02-21 | End: 2021-03-22

## 2020-02-21 RX ORDER — LEVETIRACETAM 500 MG/1
TABLET ORAL
Qty: 540 TABLET | Refills: 3 | Status: SHIPPED | OUTPATIENT
Start: 2020-02-21 | End: 2021-03-22

## 2020-02-21 RX ORDER — SILDENAFIL 100 MG/1
100 TABLET, FILM COATED ORAL DAILY PRN
Qty: 5 TABLET | Refills: 11 | Status: SHIPPED | OUTPATIENT
Start: 2020-02-21 | End: 2020-04-30

## 2020-02-21 RX ORDER — HYDROCHLOROTHIAZIDE 25 MG/1
TABLET ORAL
Qty: 90 TABLET | Refills: 3 | Status: SHIPPED | OUTPATIENT
Start: 2020-02-21 | End: 2021-03-22

## 2020-04-30 ENCOUNTER — VIRTUAL VISIT (OUTPATIENT)
Dept: FAMILY MEDICINE | Facility: CLINIC | Age: 55
End: 2020-04-30
Payer: COMMERCIAL

## 2020-04-30 VITALS — DIASTOLIC BLOOD PRESSURE: 66 MMHG | SYSTOLIC BLOOD PRESSURE: 121 MMHG

## 2020-04-30 DIAGNOSIS — R35.0 URINE FREQUENCY: ICD-10-CM

## 2020-04-30 DIAGNOSIS — I25.10 CORONARY ARTERY DISEASE DUE TO CALCIFIED CORONARY LESION: ICD-10-CM

## 2020-04-30 DIAGNOSIS — I25.84 CORONARY ARTERY DISEASE DUE TO CALCIFIED CORONARY LESION: ICD-10-CM

## 2020-04-30 DIAGNOSIS — I10 BENIGN ESSENTIAL HYPERTENSION: Primary | ICD-10-CM

## 2020-04-30 DIAGNOSIS — E78.5 HYPERLIPIDEMIA LDL GOAL <100: ICD-10-CM

## 2020-04-30 LAB
ALBUMIN UR-MCNC: NEGATIVE MG/DL
APPEARANCE UR: CLEAR
BILIRUB UR QL STRIP: NEGATIVE
COLOR UR AUTO: YELLOW
GLUCOSE UR STRIP-MCNC: NEGATIVE MG/DL
HGB UR QL STRIP: NEGATIVE
KETONES UR STRIP-MCNC: NEGATIVE MG/DL
LEUKOCYTE ESTERASE UR QL STRIP: NEGATIVE
NITRATE UR QL: NEGATIVE
PH UR STRIP: 7 PH (ref 5–7)
SOURCE: NORMAL
SP GR UR STRIP: 1.01 (ref 1–1.03)
UROBILINOGEN UR STRIP-ACNC: 0.2 EU/DL (ref 0.2–1)

## 2020-04-30 PROCEDURE — 81003 URINALYSIS AUTO W/O SCOPE: CPT | Performed by: INTERNAL MEDICINE

## 2020-04-30 PROCEDURE — 87491 CHLMYD TRACH DNA AMP PROBE: CPT | Mod: GT | Performed by: INTERNAL MEDICINE

## 2020-04-30 PROCEDURE — 87086 URINE CULTURE/COLONY COUNT: CPT | Performed by: INTERNAL MEDICINE

## 2020-04-30 PROCEDURE — 87591 N.GONORRHOEAE DNA AMP PROB: CPT | Mod: GT | Performed by: INTERNAL MEDICINE

## 2020-04-30 PROCEDURE — 99214 OFFICE O/P EST MOD 30 MIN: CPT | Mod: GT | Performed by: INTERNAL MEDICINE

## 2020-04-30 NOTE — LETTER
May 4, 2020      Allen Bills  [address] [address]        Dear ,    We are writing to inform you of your test results.        Resulted Orders   Chlamydia trachomatis PCR   Result Value Ref Range    Specimen Description Urine     Chlamydia Trachomatis PCR Negative NEG^Negative      Comment:      Negative for C. trachomatis rRNA by transcription mediated amplification.  A negative result by transcription mediated amplification does not preclude   the presence of C. trachomatis infection because results are dependent on   proper and adequate collection, absence of inhibitors, and sufficient rRNA to   be detected.     Neisseria gonorrhoeae PCR   Result Value Ref Range    Specimen Descrip Urine     N Gonorrhea PCR Negative NEG^Negative      Comment:      Negative for N. gonorrhoeae rRNA by transcription mediated amplification.  A negative result by transcription mediated amplification does not preclude   the presence of N. gonorrhoeae infection because results are dependent on   proper and adequate collection, absence of inhibitors, and sufficient rRNA to   be detected.         If you have any questions or concerns, please call the clinic at the number listed above.       Sincerely,        Hossein Farah MD

## 2020-04-30 NOTE — PROGRESS NOTES
"Allen Bills is a 54 year old male who is being evaluated via a billable video visit.      The patient has been notified of following:     \"This video visit will be conducted via a call between you and your physician/provider. We have found that certain health care needs can be provided without the need for an in-person physical exam.  This service lets us provide the care you need with a video conversation.  If a prescription is necessary we can send it directly to your pharmacy.  If lab work is needed we can place an order for that and you can then stop by our lab to have the test done at a later time.    Video visits are billed at different rates depending on your insurance coverage.  Please reach out to your insurance provider with any questions.    If during the course of the call the physician/provider feels a video visit is not appropriate, you will not be charged for this service.\"    Patient has given verbal consent for Video visit? Yes    How would you like to obtain your AVS? Mail a copy    Patient would like the video invitation sent by: Text to cell phone: [phone]    Will anyone else be joining your video visit? No      Subjective     Allen Bills is a 54 year old male who presents to clinic today for the following health issues:      Video Start Time: 8:11     The patient notes urine issues, comes and goes, somewhat cloudy looking, sometimes can be foamy or bubbly.  Went away but now back.  Started early to mid April, then gone, now a bit today.  No burning or blood.  Occasionally has back aches off and on but not new.  No fever or ns.  No gi changes.  Sexually active with wife only    For the blood pressure has been good, 121/66 today, not checking it often but fine when does check it.  No ha or dizziness, chest pain or shortness of breath.      The pain in the left neck has been much better.    The pain in left shoulder persists, not severe, did see tco and mri done and possible " tear.  Is bothersome and may pursue surgery in future.     The e.d has been better, has not tried viagra yet.    Past Medical History:   Diagnosis Date     AVM (arteriovenous malformation) brain 2005    surgery [place]     Elevated serum creatinine      Flank pain 04/2017    ct abd and pelvis nl     H/O colonoscopy 4/16    int hem otherwise nl     Hearing loss in left ear      HTN (hypertension) 2011    added norvasc 6/16     Nephrolithiasis 2005 and 2006    Dr. Chaparro     Normal echocardiogram 2006     Normal stress echocardiogram 2006, 2015, 8/17,11/19    nl     Numbness 2006    mri neg     Palpitations 2011    est echo nl [place] [facility] 8/31/11     Screening 2011, 2018    ebct score 218 in 2018, seen by Dr. Jimenez 2011; cta done 8/18 with some cad and higher calcium score     Seizure (H) 2006    right sided numbness, Dr. Rosales     Tinnitus of left ear 2005     Past Surgical History:   Procedure Laterality Date     C BRAIN AVM SURG,DURAL,COMPLX  2005    [place]     LITHOTRIPSY  2005     Social History     Socioeconomic History     Marital status:      Spouse name: Not on file     Number of children: 3     Years of education: Not on file     Highest education level: Not on file   Occupational History     Occupation: marketing and sales   Social Needs     Financial resource strain: Not on file     Food insecurity     Worry: Not on file     Inability: Not on file     Transportation needs     Medical: Not on file     Non-medical: Not on file   Tobacco Use     Smoking status: Never Smoker     Smokeless tobacco: Never Used   Substance and Sexual Activity     Alcohol use: Yes     Alcohol/week: 0.0 standard drinks     Comment: 2 drinks monthly     Drug use: No     Sexual activity: Yes     Partners: Female   Lifestyle     Physical activity     Days per week: Not on file     Minutes per session: Not on file     Stress: Not on file   Relationships     Social connections     Talks on phone: Not on file     Gets together:  "Not on file     Attends [employer] service: Not on file     Active member of club or organization: Not on file     Attends meetings of clubs or organizations: Not on file     Relationship status: Not on file     Intimate partner violence     Fear of current or ex partner: Not on file     Emotionally abused: Not on file     Physically abused: Not on file     Forced sexual activity: Not on file   Other Topics Concern     Parent/sibling w/ CABG, MI or angioplasty before 65F 55M? Not Asked   Social History Narrative     Not on file     Current Outpatient Medications   Medication Sig Dispense Refill     amLODIPine 5 MG PO tablet Take 1 tablet (5 mg) by mouth 2 times daily 180 tablet 3     Ascorbic Acid (MARCOS-C PO) Take 1,000 mg by mouth       aspirin 81 MG tablet Take 1 tablet by mouth daily.       atorvastatin 20 MG PO tablet Take 1 tablet (20 mg) by mouth daily 90 tablet 3     Cholecalciferol (VITAMIN D3 PO) Take 100 mcg by mouth daily       Cyanocobalamin (VITAMIN B-12 PO) Take 5,000 mcg by mouth       hydrochlorothiazide 25 MG PO tablet TAKE 1 TABLET (25 MG) BY MOUTH DAILY 90 tablet 3     levETIRAcetam 500 MG PO tablet TAKE 3 TABS BY MOUTH TWO TIMES DAILY 540 tablet 3     loratadine (CLARITIN) 10 MG capsule Take 10 mg by mouth daily.       Omega-3 Fatty Acids (FISH OIL) 1200 MG capsule Take 1,200 mg by mouth daily       Pyridoxine HCl (VITAMIN B6 PO) Take 100 mg by mouth daily       sildenafil (VIAGRA) 100 MG tablet Take 1 tablet (100 mg) by mouth daily as needed 5 tablet 11     sildenafil 100 MG PO tablet Take 1 tablet (100 mg) by mouth daily as needed 5 tablet 11     No Known Allergies  FAMILY HISTORY NOTED AND REVIEWED    REVIEW OF SYSTEMS: above    PHYSICAL EXAM    /66     Patient appears non toxic      Objective    There were no vitals taken for this visit.  Estimated body mass index is 30.95 kg/m  as calculated from the following:    Height as of 10/29/19: 1.651 m (5' 5\").    Weight as of 10/29/19: 84.4 kg " (186 lb).  Physical Exam     GENERAL: healthy, alert and no distress  EYES: Eyes grossly normal to inspection, conjunctivae and sclerae normal  RESP: no audible wheeze, cough, or visible cyanosis.  No visible retractions or increased work of breathing.  Able to speak fully in complete sentences.  NEURO: Cranial nerves grossly intact, mentation intact and speech normal  PSYCH: mentation appears normal, affect normal/bright, judgement and insight intact, normal speech and appearance well-groomed    ASSESSMENT:  1. Urine changes, cloudy, some frequency but that is old and suspect benign prostatic hypertrophy.  Cause not clear, will check urine  2. Hypertension, controlled  3. E.d, improved  4. Elevated cholesterol on statin  5. Presumed cad, stable    PLAN:  Urine  Exercise, diet    Hossein Farah M.D.        Video-Visit Details    Type of service:  Video Visit    Video End Time:8:37    Originating Location (pt. Location): Home    Distant Location (provider location):  [facility]     Platform used for Video Visit: Doximmynor    No follow-ups on file.       PG

## 2020-05-01 LAB
BACTERIA SPEC CULT: NO GROWTH
C TRACH DNA SPEC QL NAA+PROBE: NEGATIVE
N GONORRHOEA DNA SPEC QL NAA+PROBE: NEGATIVE
SPECIMEN SOURCE: NORMAL

## 2020-11-24 ENCOUNTER — TRANSFERRED RECORDS (OUTPATIENT)
Dept: HEALTH INFORMATION MANAGEMENT | Facility: CLINIC | Age: 55
End: 2020-11-24

## 2021-03-14 DIAGNOSIS — I25.10 CORONARY ARTERY DISEASE DUE TO CALCIFIED CORONARY LESION: ICD-10-CM

## 2021-03-14 DIAGNOSIS — I25.84 CORONARY ARTERY DISEASE DUE TO CALCIFIED CORONARY LESION: ICD-10-CM

## 2021-03-17 RX ORDER — ATORVASTATIN CALCIUM 20 MG/1
TABLET, FILM COATED ORAL
Qty: 90 TABLET | Refills: 3 | Status: SHIPPED | OUTPATIENT
Start: 2021-03-17 | End: 2021-08-24

## 2021-03-17 NOTE — CONFIDENTIAL NOTE
Routing refill request to provider for review/approval because:  Labs not current:  Lipid panel

## 2021-06-18 DIAGNOSIS — I10 BENIGN ESSENTIAL HYPERTENSION: ICD-10-CM

## 2021-06-18 RX ORDER — AMLODIPINE BESYLATE 5 MG/1
TABLET ORAL
Qty: 180 TABLET | Refills: 0 | Status: SHIPPED | OUTPATIENT
Start: 2021-06-18 | End: 2021-07-30

## 2021-06-18 NOTE — TELEPHONE ENCOUNTER
Routing refill request to provider for review/approval because:  Labs not current:    BP Readings from Last 3 Encounters:   04/30/20 121/66   10/29/19 120/63   08/24/18 110/60     Patient needs to be seen because it has been more than 1 year since last office visit.  Patient received ralph refill of 90 day supply on 3/18/21.    Last office vist: 4/30/21    Pended 30 day supply & 0 refills. Please Review.    Next office visit: none  Notify pharmacy, patient does not use mychart, will send to TC to schedule.       Leisa Eagle RN  [facility]

## 2021-06-30 DIAGNOSIS — R56.9 CONVULSIONS, UNSPECIFIED CONVULSION TYPE (H): ICD-10-CM

## 2021-06-30 RX ORDER — LEVETIRACETAM 500 MG/1
TABLET ORAL
Qty: 540 TABLET | Refills: 0 | Status: SHIPPED | OUTPATIENT
Start: 2021-06-30 | End: 2021-08-24

## 2021-06-30 NOTE — TELEPHONE ENCOUNTER
Routing refill request to provider for review/approval because:  Drug not on the [facility] refill protocol .     Last RX 3-  540 tabs , no refills.     Silvia Thakkar RN  [facility] Triage

## 2021-07-30 DIAGNOSIS — I10 BENIGN ESSENTIAL HYPERTENSION: ICD-10-CM

## 2021-07-30 RX ORDER — AMLODIPINE BESYLATE 5 MG/1
TABLET ORAL
Qty: 180 TABLET | Refills: 0 | Status: SHIPPED | OUTPATIENT
Start: 2021-07-30 | End: 2021-08-24

## 2021-07-30 NOTE — TELEPHONE ENCOUNTER
Routing refill request to provider for review/approval because:  BP and labs not current:        BP Readings from Last 3 Encounters:   04/30/20 121/66   10/29/19 120/63   08/24/18 110/60     Creatinine   Date Value Ref Range Status   10/29/2019 1.23 0.66 - 1.25 mg/dL Final     Pt has future appt 08/24/2021.

## 2021-08-23 NOTE — PROGRESS NOTES
SUBJECTIVE:   CC: Allen Bills is an 56 year old male who presents for preventative health visit.     Patient is doing very well.  He still has occasional in the left shoulder.  No chest pain or shortness of breath.  He works out very regularly.      Patient has been advised of split billing requirements and indicates understanding:   Healthy Habits:     Getting at least 3 servings of Calcium per day:  Yes    Bi-annual eye exam:  Yes    Dental care twice a year:  Yes    Sleep apnea or symptoms of sleep apnea:  Daytime drowsiness    Diet:  Regular (no restrictions)    Frequency of exercise:  6-7 days/week    Duration of exercise:  45-60 minutes    Taking medications regularly:  Yes    Barriers to taking medications:  None    Medication side effects:  None    PHQ-2 Total Score: 0    Additional concerns today:  No              Today's PHQ-2 Score:   PHQ-2 ( 1999 Pfizer) 4/30/2020   Q1: Little interest or pleasure in doing things 0   Q2: Feeling down, depressed or hopeless 0   PHQ-2 Score 0       Abuse: Current or Past(Physical, Sexual or Emotional)- No  Do you feel safe in your environment? Yes    Have you ever done Advance Care Planning? (For example, a Health Directive, POLST, or a discussion with a medical provider or your loved ones about your wishes): No, advance care planning information given to patient to review.  Patient plans to discuss their wishes with loved ones or provider.      Social History     Tobacco Use     Smoking status: Never Smoker     Smokeless tobacco: Never Used   Substance Use Topics     Alcohol use: Yes     Alcohol/week: 0.0 standard drinks     Comment: 2 drinks monthly     If you drink alcohol do you typically have >3 drinks per day or >7 drinks per week? Yes               Past Medical History:      Past Medical History:   Diagnosis Date     AVM (arteriovenous malformation) brain 2005    surgery [place]     Elevated serum creatinine      Flank pain 04/2017    ct abd and pelvis nl      H/O colonoscopy 04/2016    int hem otherwise nl     Hearing loss in left ear      HTN (hypertension) 2011    added norvasc 6/16     Male erectile disorder 2019     Nephrolithiasis 2005 and 2006    Dr. Chaparro     Normal echocardiogram 2006     Normal stress echocardiogram 2006, 2015, 8/17,11/19    nl     Numbness 2006    mri neg     Palpitations 2011    est echo nl [place] [facility] 8/31/11     Screening 2011, 2018    ebct score 218 in 2018, seen by Dr. Jimenez 2011; cta done 8/18 with some cad and higher calcium score     Seizure (H) 2006    right sided numbness, Dr. Rosales     Tinnitus of left ear 2005             Past Surgical History:      Past Surgical History:   Procedure Laterality Date     C BRAIN AVM SURG,DURAL,COMPLX  2005    [place]     LITHOTRIPSY  2005             Social History:     Social History     Socioeconomic History     Marital status:      Spouse name: Not on file     Number of children: 3     Years of education: Not on file     Highest education level: Not on file   Occupational History     Occupation: marketing and sales   Tobacco Use     Smoking status: Never Smoker     Smokeless tobacco: Never Used   Substance and Sexual Activity     Alcohol use: Yes     Alcohol/week: 0.0 standard drinks     Comment: 2 drinks monthly     Drug use: No     Sexual activity: Yes     Partners: Female   Other Topics Concern     Parent/sibling w/ CABG, MI or angioplasty before 65F 55M? Not Asked   Social History Narrative     Not on file     Social Determinants of Health     Financial Resource Strain:      Difficulty of Paying Living Expenses:    Food Insecurity:      Worried About Running Out of Food in the Last Year:      Ran Out of Food in the Last Year:    Transportation Needs:      Lack of Transportation (Medical):      Lack of Transportation (Non-Medical):    Physical Activity:      Days of Exercise per Week:      Minutes of Exercise per Session:    Stress:      Feeling of Stress :    Social Connections:   "    Frequency of Communication with Friends and Family:      Frequency of Social Gatherings with Friends and Family:      Attends [employer] Services:      Active Member of Clubs or Organizations:      Attends Club or Organization Meetings:      Marital Status:    Intimate Partner Violence:      Fear of Current or Ex-Partner:      Emotionally Abused:      Physically Abused:      Sexually Abused:              Family History:   reviewed         Allergies:   No Known Allergies          Medications:     Current Outpatient Medications   Medication Sig Dispense Refill     amLODIPine (NORVASC) 5 MG tablet TAKE 1 TABLET BY MOUTH TWICE A  tablet 3     Ascorbic Acid (MARCOS-C PO) Take 1,000 mg by mouth       aspirin 81 MG tablet Take 1 tablet by mouth daily.       atorvastatin (LIPITOR) 40 MG tablet Take 1 tablet (40 mg) by mouth daily 90 tablet 3     Cholecalciferol (VITAMIN D3 PO) Take 100 mcg by mouth daily       Coenzyme Q10 (COQ10) 200 MG CAPS        hydrochlorothiazide (HYDRODIURIL) 25 MG tablet Take 1 tablet (25 mg) by mouth daily 90 tablet 3     levETIRAcetam (KEPPRA) 500 MG tablet TAKE 3 TABS BY MOUTH TWO TIMES DAILY 540 tablet 3     loratadine (CLARITIN) 10 MG capsule Take 10 mg by mouth daily.       Omega-3 Fatty Acids (FISH OIL) 1200 MG capsule Take 1,200 mg by mouth daily       Probiotic Product (PRO-BIOTIC BLEND PO)        Pyridoxine HCl (VITAMIN B6 PO) Take 100 mg by mouth daily       UNABLE TO FIND MEDICATION NAME: triple flex                 Review of Systems:   The 10 point Review of Systems is negative other than noted in the HPI           Physical Exam:   Blood pressure 126/73, pulse 71, temperature 97.5  F (36.4  C), temperature source Tympanic, resp. rate 16, height 1.651 m (5' 5\"), weight 95.3 kg (210 lb), SpO2 100 %.    Exam:  Constitutional: healthy appearing, alert and in no distress  Heent: Normocephalic. Head without obvious masses or lesions. PERRLDC, EOMI. Mouth exam within normal limits: " tongue, mucous membranes, posterior pharynx all normal, no lesions or abnormalities seen.  Tm's and canals within normal limits bilaterally. Neck supple, no nuchal rigidity or masses. No supraclavicular, or cervical adenopathy. Thyroid symmetric, no masses.  Cardiovascular: Regular rate and rhythm, no murmer, rub or gallops.  JVP not elevated, no edema.  Carotids within normal limits bilaterally, no bruits.  Respiratory: Normal respiratory effort.  Lungs clear, normal flow, no wheezing or crackles.  Breasts: Normal bilaterally.  No masses or lesions.  Nipples within normal limites.  No axillary lesions or nodes.  Gastrointestinal: Normal active bowel sounds.   Soft, not tender, no masses, guarding or rebound.  No hepatosplenomegaly.   Genitourinary: Rectal min bph  Musculoskeletal: extremities normal, no gross deformities noted.  Skin: no suspicious lesions or rashes   Neurologic: Mental status within normal limits.  Speech fluent.  No gross motor abnormalities and gait intact.  Psychiatric: mentation appears normal and affect normal.         Data:   Labs sent        Assessment:   1. Normal complete physical exam  2. Cad, med mgmt, will raise to 40mg lipitor  3. Elevated cholesterol on staitn  4. Hypertension, controlled  5. Sz, no issues  6. Avm, no issues  7. Elevated creat, follow up labs  8. hcm         Plan:   Up to date immunizations, colon  Exercise, diet  Change lipitor to 40mg  Letter with labs      Hossein Farah M.D.

## 2021-08-24 ENCOUNTER — OFFICE VISIT (OUTPATIENT)
Dept: FAMILY MEDICINE | Facility: CLINIC | Age: 56
End: 2021-08-24
Payer: COMMERCIAL

## 2021-08-24 VITALS
DIASTOLIC BLOOD PRESSURE: 73 MMHG | BODY MASS INDEX: 33.49 KG/M2 | TEMPERATURE: 97.5 F | WEIGHT: 201 LBS | SYSTOLIC BLOOD PRESSURE: 126 MMHG | RESPIRATION RATE: 16 BRPM | HEIGHT: 65 IN | HEART RATE: 71 BPM | OXYGEN SATURATION: 100 %

## 2021-08-24 DIAGNOSIS — R56.9 CONVULSIONS, UNSPECIFIED CONVULSION TYPE (H): ICD-10-CM

## 2021-08-24 DIAGNOSIS — I10 BENIGN ESSENTIAL HYPERTENSION: ICD-10-CM

## 2021-08-24 DIAGNOSIS — I25.10 CORONARY ARTERY DISEASE DUE TO CALCIFIED CORONARY LESION: ICD-10-CM

## 2021-08-24 DIAGNOSIS — Q28.2 AVM (ARTERIOVENOUS MALFORMATION) BRAIN: ICD-10-CM

## 2021-08-24 DIAGNOSIS — Z00.00 ENCOUNTER FOR ANNUAL PHYSICAL EXAM: Primary | ICD-10-CM

## 2021-08-24 DIAGNOSIS — I25.84 CORONARY ARTERY DISEASE DUE TO CALCIFIED CORONARY LESION: ICD-10-CM

## 2021-08-24 DIAGNOSIS — E78.5 HYPERLIPIDEMIA LDL GOAL <100: ICD-10-CM

## 2021-08-24 DIAGNOSIS — R79.89 ELEVATED SERUM CREATININE: ICD-10-CM

## 2021-08-24 DIAGNOSIS — R56.9 SEIZURE (H): ICD-10-CM

## 2021-08-24 DIAGNOSIS — N20.0 NEPHROLITHIASIS: ICD-10-CM

## 2021-08-24 LAB
ALBUMIN SERPL-MCNC: 4 G/DL (ref 3.4–5)
ALP SERPL-CCNC: 58 U/L (ref 40–150)
ALT SERPL W P-5'-P-CCNC: 46 U/L (ref 0–70)
ANION GAP SERPL CALCULATED.3IONS-SCNC: 6 MMOL/L (ref 3–14)
AST SERPL W P-5'-P-CCNC: 36 U/L (ref 0–45)
BILIRUB SERPL-MCNC: 1 MG/DL (ref 0.2–1.3)
BUN SERPL-MCNC: 22 MG/DL (ref 7–30)
CALCIUM SERPL-MCNC: 9.6 MG/DL (ref 8.5–10.1)
CHLORIDE BLD-SCNC: 103 MMOL/L (ref 94–109)
CHOLEST SERPL-MCNC: 130 MG/DL
CO2 SERPL-SCNC: 30 MMOL/L (ref 20–32)
CREAT SERPL-MCNC: 1.37 MG/DL (ref 0.66–1.25)
ERYTHROCYTE [DISTWIDTH] IN BLOOD BY AUTOMATED COUNT: 12.9 % (ref 10–15)
FASTING STATUS PATIENT QL REPORTED: YES
GFR SERPL CREATININE-BSD FRML MDRD: 56 ML/MIN/1.73M2
GLUCOSE BLD-MCNC: 102 MG/DL (ref 70–99)
HCT VFR BLD AUTO: 42.6 % (ref 40–53)
HDLC SERPL-MCNC: 58 MG/DL
HGB BLD-MCNC: 14.8 G/DL (ref 13.3–17.7)
HIV 1+2 AB+HIV1 P24 AG SERPL QL IA: NONREACTIVE
LDLC SERPL CALC-MCNC: 53 MG/DL
MCH RBC QN AUTO: 32.1 PG (ref 26.5–33)
MCHC RBC AUTO-ENTMCNC: 34.7 G/DL (ref 31.5–36.5)
MCV RBC AUTO: 92 FL (ref 78–100)
NONHDLC SERPL-MCNC: 72 MG/DL
PLATELET # BLD AUTO: 254 10E3/UL (ref 150–450)
POTASSIUM BLD-SCNC: 3.5 MMOL/L (ref 3.4–5.3)
PROT SERPL-MCNC: 7.2 G/DL (ref 6.8–8.8)
PSA SERPL-MCNC: 0.91 UG/L (ref 0–4)
RBC # BLD AUTO: 4.61 10E6/UL (ref 4.4–5.9)
SODIUM SERPL-SCNC: 139 MMOL/L (ref 133–144)
TRIGL SERPL-MCNC: 95 MG/DL
WBC # BLD AUTO: 6.6 10E3/UL (ref 4–11)

## 2021-08-24 PROCEDURE — 80061 LIPID PANEL: CPT | Performed by: INTERNAL MEDICINE

## 2021-08-24 PROCEDURE — 36415 COLL VENOUS BLD VENIPUNCTURE: CPT | Performed by: INTERNAL MEDICINE

## 2021-08-24 PROCEDURE — 99396 PREV VISIT EST AGE 40-64: CPT | Performed by: INTERNAL MEDICINE

## 2021-08-24 PROCEDURE — 87389 HIV-1 AG W/HIV-1&-2 AB AG IA: CPT | Performed by: INTERNAL MEDICINE

## 2021-08-24 PROCEDURE — 85027 COMPLETE CBC AUTOMATED: CPT | Performed by: INTERNAL MEDICINE

## 2021-08-24 PROCEDURE — 80053 COMPREHEN METABOLIC PANEL: CPT | Performed by: INTERNAL MEDICINE

## 2021-08-24 PROCEDURE — G0103 PSA SCREENING: HCPCS | Performed by: INTERNAL MEDICINE

## 2021-08-24 RX ORDER — ATORVASTATIN CALCIUM 20 MG/1
20 TABLET, FILM COATED ORAL DAILY
Qty: 90 TABLET | Refills: 3 | Status: CANCELLED | OUTPATIENT
Start: 2021-08-24

## 2021-08-24 RX ORDER — LEVETIRACETAM 500 MG/1
TABLET ORAL
Qty: 540 TABLET | Refills: 3 | Status: SHIPPED | OUTPATIENT
Start: 2021-08-24 | End: 2022-10-18

## 2021-08-24 RX ORDER — AMLODIPINE BESYLATE 5 MG/1
TABLET ORAL
Qty: 180 TABLET | Refills: 3 | Status: SHIPPED | OUTPATIENT
Start: 2021-08-24 | End: 2022-04-14

## 2021-08-24 RX ORDER — AMPICILLIN TRIHYDRATE 250 MG
CAPSULE ORAL
COMMUNITY

## 2021-08-24 RX ORDER — HYDROCHLOROTHIAZIDE 25 MG/1
25 TABLET ORAL DAILY
Qty: 90 TABLET | Refills: 3 | Status: SHIPPED | OUTPATIENT
Start: 2021-08-24 | End: 2022-10-18

## 2021-08-24 RX ORDER — ATORVASTATIN CALCIUM 40 MG/1
40 TABLET, FILM COATED ORAL DAILY
Qty: 90 TABLET | Refills: 3 | Status: SHIPPED | OUTPATIENT
Start: 2021-08-24 | End: 2022-08-26

## 2021-08-24 ASSESSMENT — MIFFLIN-ST. JEOR: SCORE: 1658.61

## 2021-08-24 NOTE — LETTER
August 25, 2021      Allen Bills  [address] [facility] [address]        Dear Shiva García,     The labs look very good.  The creatinine or kidney test has been high before so not a worry.  Will check it yearly.  Your other chemistries, psa, blood count and cholesterol are good.  I still want to raise the lipitor dose.  Let me know if any questions.       Resulted Orders   CBC with platelets   Result Value Ref Range    WBC Count 6.6 4.0 - 11.0 10e3/uL    RBC Count 4.61 4.40 - 5.90 10e6/uL    Hemoglobin 14.8 13.3 - 17.7 g/dL    Hematocrit 42.6 40.0 - 53.0 %    MCV 92 78 - 100 fL    MCH 32.1 26.5 - 33.0 pg    MCHC 34.7 31.5 - 36.5 g/dL    RDW 12.9 10.0 - 15.0 %    Platelet Count 254 150 - 450 10e3/uL   Comprehensive metabolic panel   Result Value Ref Range    Sodium 139 133 - 144 mmol/L    Potassium 3.5 3.4 - 5.3 mmol/L    Chloride 103 94 - 109 mmol/L    Carbon Dioxide (CO2) 30 20 - 32 mmol/L    Anion Gap 6 3 - 14 mmol/L    Urea Nitrogen 22 7 - 30 mg/dL    Creatinine 1.37 (H) 0.66 - 1.25 mg/dL    Calcium 9.6 8.5 - 10.1 mg/dL    Glucose 102 (H) 70 - 99 mg/dL    Alkaline Phosphatase 58 40 - 150 U/L    AST 36 0 - 45 U/L    ALT 46 0 - 70 U/L    Protein Total 7.2 6.8 - 8.8 g/dL    Albumin 4.0 3.4 - 5.0 g/dL    Bilirubin Total 1.0 0.2 - 1.3 mg/dL    GFR Estimate 56 (L) >60 mL/min/1.73m2      Comment:      As of July 11, 2021, eGFR is calculated by the CKD-EPI creatinine equation, without race adjustment. eGFR can be influenced by muscle mass, exercise, and diet. The reported eGFR is an estimation only and is only applicable if the renal function is stable.   Lipid Profile   Result Value Ref Range    Cholesterol 130 <200 mg/dL      Comment:      Age 0-19 years  Desirable: <170 mg/dL  Borderline high:  170-199 mg/dl  High:            >199 mg/dl    Age 20 years and older  Desirable: <200 mg/dL    Triglycerides 95 <150 mg/dL      Comment:      0-9 years:  Normal:    Less than 75 mg/dL  Borderline high:   75-99 mg/dL  High:             Greater than or equal to 100 mg/dL    0-19 years:  Normal:    Less than 90 mg/dL  Borderline high:   mg/dL  High:             Greater than or equal to 130 mg/dL    20 years and older:  Normal:    Less than 150 mg/dL  Borderline high:  150-199 mg/dL  High:             200-499 mg/dL  Very high:   Greater than or equal to 500 mg/dL    Direct Measure HDL 58 >=40 mg/dL      Comment:      0-19 years:       Greater than or equal to 45 mg/dL   Low: Less than 40 mg/dL   Borderline low: 40-44 mg/dL     20 years and older:   Female: Greater than or equal to 50 mg/dL   Male:   Greater than or equal to 40 mg/dL         LDL Cholesterol Calculated 53 <=100 mg/dL      Comment:      Age 0-19 years:  Desirable: 0-110 mg/dL   Borderline high: 110-129 mg/dL   High: >= 130 mg/dL    Age 20 years and older:  Desirable: <100mg/dL  Above desirable: 100-129 mg/dL   Borderline high: 130-159 mg/dL   High: 160-189 mg/dL   Very high: >= 190 mg/dL    Non HDL Cholesterol 72 <130 mg/dL      Comment:      0-19 years:  Desirable:          Less than 120 mg/dL  Borderline high:   120-144 mg/dL  High:                   Greater than or equal to 145 mg/dL    20 years and older:  Desirable:          130 mg/dL  Above Desirable: 130-159 mg/dL  Borderline high:   160-189 mg/dL  High:               190-219 mg/dL  Very high:     Greater than or equal to 220 mg/dL    Patient Fasting > 8hrs? Yes    Prostate Specific Antigen Screen   Result Value Ref Range    Prostate Specific Antigen Screen 0.91 0.00 - 4.00 ug/L   HIV Antigen Antibody Combo   Result Value Ref Range    HIV Antigen Antibody Combo Nonreactive Nonreactive      Comment:      HIV-1 p24 Ag & HIV-1/HIV-2 Ab Not Detected       If you have any questions or concerns, please call the clinic at the number listed above.       Sincerely,      Hossein Farah MD

## 2021-08-25 NOTE — RESULT ENCOUNTER NOTE
Al,    The labs look very good.  The creatinine or kidney test has been high before so not a worry.  Will check it yearly.  Your other chemistries, psa, blood count and cholesterol are good.  I still want to raise the lipitor dose.  Let me know if any questions.

## 2022-04-11 DIAGNOSIS — I10 BENIGN ESSENTIAL HYPERTENSION: ICD-10-CM

## 2022-04-12 RX ORDER — AMLODIPINE BESYLATE 5 MG/1
TABLET ORAL
Qty: 180 TABLET | Refills: 3 | OUTPATIENT
Start: 2022-04-12

## 2022-04-12 NOTE — TELEPHONE ENCOUNTER
Too soon to refill  E-Prescribing Status: Receipt confirmed by pharmacy (8/24/2021  7:23 AM CDT)    Brandi DEJESUS RN, BSN

## 2022-04-13 DIAGNOSIS — I10 BENIGN ESSENTIAL HYPERTENSION: ICD-10-CM

## 2022-04-14 DIAGNOSIS — I10 BENIGN ESSENTIAL HYPERTENSION: ICD-10-CM

## 2022-04-14 RX ORDER — AMLODIPINE BESYLATE 5 MG/1
TABLET ORAL
Qty: 180 TABLET | Refills: 3 | Status: SHIPPED | OUTPATIENT
Start: 2022-04-14 | End: 2022-10-18

## 2022-04-15 RX ORDER — AMLODIPINE BESYLATE 5 MG/1
TABLET ORAL
Qty: 180 TABLET | Refills: 3 | OUTPATIENT
Start: 2022-04-15

## 2022-08-26 DIAGNOSIS — E78.5 HYPERLIPIDEMIA LDL GOAL <100: ICD-10-CM

## 2022-08-26 RX ORDER — ATORVASTATIN CALCIUM 40 MG/1
TABLET, FILM COATED ORAL
Qty: 90 TABLET | Refills: 3 | Status: SHIPPED | OUTPATIENT
Start: 2022-08-26 | End: 2022-10-18

## 2022-09-27 ENCOUNTER — TELEPHONE (OUTPATIENT)
Dept: FAMILY MEDICINE | Facility: CLINIC | Age: 57
End: 2022-09-27

## 2022-10-18 ENCOUNTER — OFFICE VISIT (OUTPATIENT)
Dept: FAMILY MEDICINE | Facility: CLINIC | Age: 57
End: 2022-10-18
Payer: COMMERCIAL

## 2022-10-18 VITALS
WEIGHT: 210 LBS | RESPIRATION RATE: 16 BRPM | SYSTOLIC BLOOD PRESSURE: 132 MMHG | DIASTOLIC BLOOD PRESSURE: 76 MMHG | BODY MASS INDEX: 34.99 KG/M2 | OXYGEN SATURATION: 100 % | HEIGHT: 65 IN | HEART RATE: 67 BPM | TEMPERATURE: 97.7 F

## 2022-10-18 DIAGNOSIS — I25.84 CORONARY ARTERY DISEASE DUE TO CALCIFIED CORONARY LESION: ICD-10-CM

## 2022-10-18 DIAGNOSIS — Z00.00 ENCOUNTER FOR ANNUAL PHYSICAL EXAM: Primary | ICD-10-CM

## 2022-10-18 DIAGNOSIS — R79.89 ELEVATED SERUM CREATININE: ICD-10-CM

## 2022-10-18 DIAGNOSIS — R56.9 CONVULSIONS, UNSPECIFIED CONVULSION TYPE (H): ICD-10-CM

## 2022-10-18 DIAGNOSIS — R73.9 ELEVATED BLOOD SUGAR: ICD-10-CM

## 2022-10-18 DIAGNOSIS — I10 BENIGN ESSENTIAL HYPERTENSION: ICD-10-CM

## 2022-10-18 DIAGNOSIS — E78.5 HYPERLIPIDEMIA LDL GOAL <100: ICD-10-CM

## 2022-10-18 DIAGNOSIS — I25.10 CORONARY ARTERY DISEASE DUE TO CALCIFIED CORONARY LESION: ICD-10-CM

## 2022-10-18 LAB
ALBUMIN SERPL-MCNC: 4 G/DL (ref 3.4–5)
ALBUMIN UR-MCNC: NEGATIVE MG/DL
ALP SERPL-CCNC: 74 U/L (ref 40–150)
ALT SERPL W P-5'-P-CCNC: 42 U/L (ref 0–70)
ANION GAP SERPL CALCULATED.3IONS-SCNC: 6 MMOL/L (ref 3–14)
APPEARANCE UR: CLEAR
AST SERPL W P-5'-P-CCNC: 32 U/L (ref 0–45)
BACTERIA #/AREA URNS HPF: NORMAL /HPF
BILIRUB SERPL-MCNC: 0.7 MG/DL (ref 0.2–1.3)
BILIRUB UR QL STRIP: NEGATIVE
BUN SERPL-MCNC: 18 MG/DL (ref 7–30)
CALCIUM SERPL-MCNC: 9 MG/DL (ref 8.5–10.1)
CHLORIDE BLD-SCNC: 106 MMOL/L (ref 94–109)
CHOLEST SERPL-MCNC: 125 MG/DL
CO2 SERPL-SCNC: 28 MMOL/L (ref 20–32)
COLOR UR AUTO: YELLOW
CREAT SERPL-MCNC: 1.14 MG/DL (ref 0.66–1.25)
CREAT UR-MCNC: 172 MG/DL
ERYTHROCYTE [DISTWIDTH] IN BLOOD BY AUTOMATED COUNT: 12.2 % (ref 10–15)
FASTING STATUS PATIENT QL REPORTED: YES
GFR SERPL CREATININE-BSD FRML MDRD: 74 ML/MIN/1.73M2
GLUCOSE BLD-MCNC: 102 MG/DL (ref 70–99)
GLUCOSE UR STRIP-MCNC: NEGATIVE MG/DL
HBA1C MFR BLD: 5.7 % (ref 0–5.6)
HCT VFR BLD AUTO: 44.1 % (ref 40–53)
HDLC SERPL-MCNC: 62 MG/DL
HGB BLD-MCNC: 14.6 G/DL (ref 13.3–17.7)
HGB UR QL STRIP: ABNORMAL
KETONES UR STRIP-MCNC: NEGATIVE MG/DL
LDLC SERPL CALC-MCNC: 48 MG/DL
LEUKOCYTE ESTERASE UR QL STRIP: NEGATIVE
MCH RBC QN AUTO: 30.7 PG (ref 26.5–33)
MCHC RBC AUTO-ENTMCNC: 33.1 G/DL (ref 31.5–36.5)
MCV RBC AUTO: 93 FL (ref 78–100)
MICROALBUMIN UR-MCNC: 8 MG/L
MICROALBUMIN/CREAT UR: 4.65 MG/G CR (ref 0–17)
NITRATE UR QL: NEGATIVE
NONHDLC SERPL-MCNC: 63 MG/DL
PH UR STRIP: 6.5 [PH] (ref 5–7)
PLATELET # BLD AUTO: 275 10E3/UL (ref 150–450)
POTASSIUM BLD-SCNC: 3.5 MMOL/L (ref 3.4–5.3)
PROT SERPL-MCNC: 7.2 G/DL (ref 6.8–8.8)
RBC # BLD AUTO: 4.76 10E6/UL (ref 4.4–5.9)
RBC #/AREA URNS AUTO: NORMAL /HPF
SODIUM SERPL-SCNC: 140 MMOL/L (ref 133–144)
SP GR UR STRIP: 1.02 (ref 1–1.03)
SQUAMOUS #/AREA URNS AUTO: NORMAL /LPF
TRIGL SERPL-MCNC: 73 MG/DL
UROBILINOGEN UR STRIP-ACNC: 0.2 E.U./DL
WBC # BLD AUTO: 6.7 10E3/UL (ref 4–11)
WBC #/AREA URNS AUTO: NORMAL /HPF

## 2022-10-18 PROCEDURE — 99396 PREV VISIT EST AGE 40-64: CPT | Performed by: INTERNAL MEDICINE

## 2022-10-18 PROCEDURE — 81001 URINALYSIS AUTO W/SCOPE: CPT | Performed by: INTERNAL MEDICINE

## 2022-10-18 PROCEDURE — 36415 COLL VENOUS BLD VENIPUNCTURE: CPT | Performed by: INTERNAL MEDICINE

## 2022-10-18 PROCEDURE — 80053 COMPREHEN METABOLIC PANEL: CPT | Performed by: INTERNAL MEDICINE

## 2022-10-18 PROCEDURE — 83036 HEMOGLOBIN GLYCOSYLATED A1C: CPT | Performed by: INTERNAL MEDICINE

## 2022-10-18 PROCEDURE — 82043 UR ALBUMIN QUANTITATIVE: CPT | Performed by: INTERNAL MEDICINE

## 2022-10-18 PROCEDURE — 80061 LIPID PANEL: CPT | Performed by: INTERNAL MEDICINE

## 2022-10-18 PROCEDURE — 85027 COMPLETE CBC AUTOMATED: CPT | Performed by: INTERNAL MEDICINE

## 2022-10-18 RX ORDER — AMLODIPINE BESYLATE 5 MG/1
TABLET ORAL
Qty: 180 TABLET | Refills: 3 | Status: SHIPPED | OUTPATIENT
Start: 2022-10-18 | End: 2023-10-02

## 2022-10-18 RX ORDER — HYDROCHLOROTHIAZIDE 25 MG/1
25 TABLET ORAL DAILY
Qty: 90 TABLET | Refills: 3 | Status: SHIPPED | OUTPATIENT
Start: 2022-10-18 | End: 2023-10-02

## 2022-10-18 RX ORDER — LEVETIRACETAM 500 MG/1
TABLET ORAL
Qty: 540 TABLET | Refills: 3 | Status: SHIPPED | OUTPATIENT
Start: 2022-10-18 | End: 2023-10-02

## 2022-10-18 RX ORDER — ATORVASTATIN CALCIUM 40 MG/1
40 TABLET, FILM COATED ORAL DAILY
Qty: 90 TABLET | Refills: 3 | Status: SHIPPED | OUTPATIENT
Start: 2022-10-18 | End: 2023-10-02

## 2022-10-18 ASSESSMENT — PAIN SCALES - GENERAL: PAINLEVEL: NO PAIN (0)

## 2022-10-18 NOTE — LETTER
[place] [address] [address] [facility]  [address]  [place]  [address]  Tel: [phone]    October 18, 2022    Allen Bills  [address] [facility] [address]        Dear Mr. Melgardanettejanelle,    Enclosed are your labs.  They look very good.  Your cholesterol is wonderful at 125.  Your HDL or good cholesterol is 62 which is very good and the LDL or bad is excellent at 48.     Your blood salts, kidney tests, liver tests, and proteins are normal.  Your complete blood count is also normal with no signs of anemia or blood disorders.     Your urine is just fine.  The trace amount of blood is really insignificant.     Your blood sugar is just slightly high as you can see at 102.  The other diabetes test I talked to you about, the hemoglobin A1c, is also just barely above normal at 5.7.  You do not have diabetes but are at risk of developing it so please be sure to keep up the exercise and try and get the weight down.  A diet such as the Mediterranean is one of the better ones if you can do that.     Please let me know if you have questions.     Gretchen

## 2022-10-18 NOTE — PROGRESS NOTES
Is a 57-year-old presents for a physical.  Overall he is doing quite well.  He exercises very regularly.  His weight remains a bit of an issue.  He has no complaints except occasionally he can feel 1 second of a palpitation.  No chest pain or shortness of breath.  No other complaints.               Past Medical History:      Past Medical History:   Diagnosis Date     AVM (arteriovenous malformation) brain 2005    surgery [place]     Elevated serum creatinine      Flank pain 04/2017    ct abd and pelvis nl     H/O colonoscopy 04/2016    int hem otherwise nl     Hearing loss in left ear      HTN (hypertension) 2011    added norvasc 6/16     Male erectile disorder 2019     Nephrolithiasis 2005 and 2006    Dr. Chaparro     Normal echocardiogram 2006     Normal stress echocardiogram 2006, 2015, 8/17,11/19    nl     Numbness 2006    mri neg     Palpitations 2011    est echo nl [place] [facility] 8/31/11     Screening 2011, 2018    ebct score 218 in 2018, seen by Dr. Jimenez 2011; cta done 8/18 with some cad and higher calcium score     Seizure (H) 2006    right sided numbness, Dr. Rosales     Tinnitus of left ear 2005             Past Surgical History:      Past Surgical History:   Procedure Laterality Date     LITHOTRIPSY  2005     [facility] BRAIN AVM SURG,DURAL,COMPLX  2005    [place]             Social History:     Social History     Socioeconomic History     Marital status:      Spouse name: Not on file     Number of children: 3     Years of education: Not on file     Highest education level: Not on file   Occupational History     Occupation: marketing and sales   Tobacco Use     Smoking status: Never     Smokeless tobacco: Never   Substance and Sexual Activity     Alcohol use: Yes     Alcohol/week: 0.0 standard drinks     Comment: 2 drinks monthly     Drug use: No     Sexual activity: Yes     Partners: Female   Other Topics Concern     Parent/sibling w/ CABG, MI or angioplasty before 65F 55M? Not Asked   Social History  "Narrative     Not on file     Social Determinants of Health     Financial Resource Strain: Not on file   Food Insecurity: Not on file   Transportation Needs: Not on file   Physical Activity: Not on file   Stress: Not on file   Social Connections: Not on file   Intimate Partner Violence: Not on file   Housing Stability: Not on file             Family History:   reviewed         Allergies:   No Known Allergies          Medications:     Current Outpatient Medications   Medication Sig Dispense Refill     amLODIPine (NORVASC) 5 MG tablet TAKE 1 TABLET BY MOUTH TWICE A  tablet 3     Ascorbic Acid (MARCOS-C PO) Take 1,000 mg by mouth       aspirin 81 MG tablet Take 1 tablet by mouth daily.       atorvastatin (LIPITOR) 40 MG tablet Take 1 tablet (40 mg) by mouth daily 90 tablet 3     Cholecalciferol (VITAMIN D3 PO) Take 100 mcg by mouth daily       Coenzyme Q10 (COQ10) 200 MG CAPS        hydrochlorothiazide (HYDRODIURIL) 25 MG tablet Take 1 tablet (25 mg) by mouth daily 90 tablet 3     levETIRAcetam (KEPPRA) 500 MG tablet TAKE 3 TABS BY MOUTH TWO TIMES DAILY 540 tablet 3     loratadine 10 MG capsule Take 10 mg by mouth daily.       Omega-3 Fatty Acids (FISH OIL) 1200 MG capsule Take 1,200 mg by mouth daily       Probiotic Product (PRO-BIOTIC BLEND PO)        Pyridoxine HCl (VITAMIN B6 PO) Take 100 mg by mouth daily       UNABLE TO FIND MEDICATION NAME: triple flex                 Review of Systems:   The 10 point Review of Systems is negative other than noted in the HPI           Physical Exam:   Blood pressure 132/76, pulse 67, temperature 97.7  F (36.5  C), temperature source Temporal, resp. rate 16, height 1.651 m (5' 5\"), weight 95.3 kg (210 lb), SpO2 100 %.    Exam:  Constitutional: healthy appearing, alert and in no distress  Heent: Normocephalic. Head without obvious masses or lesions. PERRLDC, EOMI. Mouth exam within normal limits: tongue, mucous membranes, posterior pharynx all normal, no lesions or " abnormalities seen.  Tm's and canals within normal limits bilaterally. Neck supple, no nuchal rigidity or masses. No supraclavicular, or cervical adenopathy. Thyroid symmetric, no masses.  Cardiovascular: Regular rate and rhythm, no murmer, rub or gallops.  JVP not elevated, no edema.  Carotids within normal limits bilaterally, no bruits.  Respiratory: Normal respiratory effort.  Lungs clear, normal flow, no wheezing or crackles.  Breasts: Normal bilaterally.  No masses or lesions.  Nipples within normal limites.  No axillary lesions or nodes.  Gastrointestinal: Normal active bowel sounds.   Soft, not tender, no masses, guarding or rebound.  No hepatosplenomegaly.   Genitourinary: Rectal not done   Musculoskeletal: extremities normal, no gross deformities noted.  Skin: no suspicious lesions or rashes   Neurologic: Mental status within normal limits.  Speech fluent.  No gross motor abnormalities and gait intact.  Psychiatric: mentation appears normal and affect normal.         Data:   Labs sent        Assessment:   1. Normal complete physical exam  2. Hypertension, controlled  3. Elevated creat, follow up labs, could consider acei  4. Presumed cad, med mgmt, follow up est, control blood pressure, cholesterol, etc  5. Sz, no issues  6. Elevated sugar, follow up labs  7. Elevated cholesterol, goal ldl under 70  8. hcm         Plan:   Up to date colon, immunizations  Est echo  Letter with labs  Monitor blood pressure and if up call  Exercise, diet, weight loss      Hossein Farah M.D.

## 2022-10-18 NOTE — RESULT ENCOUNTER NOTE
Al,    Enclosed are your labs.  They look very good.  Your cholesterol is wonderful at 125.  Your HDL or good cholesterol is 62 which is very good and the LDL or bad is excellent at 48.    Your blood salts, kidney tests, liver tests, and proteins are normal.  Your complete blood count is also normal with no signs of anemia or blood disorders.    Your urine is just fine.  The trace amount of blood is really insignificant.    Your blood sugar is just slightly high as you can see at 102.  The other diabetes test I talked to you about, the hemoglobin A1c, is also just barely above normal at 5.7.  You do not have diabetes but are at risk of developing it so please be sure to keep up the exercise and try and get the weight down.  A diet such as the Mediterranean is one of the better ones if you can do that.    Please let me know if you have questions.    Hossein

## 2022-12-02 ENCOUNTER — TELEPHONE (OUTPATIENT)
Dept: FAMILY MEDICINE | Facility: CLINIC | Age: 57
End: 2022-12-02

## 2022-12-02 DIAGNOSIS — Z20.828 EXPOSURE TO INFLUENZA: Primary | ICD-10-CM

## 2022-12-02 RX ORDER — OSELTAMIVIR PHOSPHATE 75 MG/1
75 CAPSULE ORAL DAILY
Qty: 10 CAPSULE | Refills: 0 | Status: SHIPPED | OUTPATIENT
Start: 2022-12-02 | End: 2024-01-25

## 2022-12-02 NOTE — TELEPHONE ENCOUNTER
The patient's wife has influenza and he would like to get treatment so I sent prophylactic treatment in.    Hossein Farah M.D.

## 2023-01-06 ENCOUNTER — HOSPITAL ENCOUNTER (OUTPATIENT)
Dept: CARDIOLOGY | Facility: CLINIC | Age: 58
Discharge: HOME | End: 2023-01-06
Attending: INTERNAL MEDICINE | Admitting: INTERNAL MEDICINE
Payer: COMMERCIAL

## 2023-01-06 DIAGNOSIS — I25.84 CORONARY ARTERY DISEASE DUE TO CALCIFIED CORONARY LESION: ICD-10-CM

## 2023-01-06 DIAGNOSIS — I25.10 CORONARY ARTERY DISEASE DUE TO CALCIFIED CORONARY LESION: ICD-10-CM

## 2023-01-06 PROCEDURE — 93017 CV STRESS TEST TRACING ONLY: CPT

## 2023-01-06 PROCEDURE — 93018 CV STRESS TEST I&R ONLY: CPT | Performed by: INTERNAL MEDICINE

## 2023-01-06 PROCEDURE — 93016 CV STRESS TEST SUPVJ ONLY: CPT | Performed by: INTERNAL MEDICINE

## 2024-01-15 ENCOUNTER — TELEPHONE (OUTPATIENT)
Dept: FAMILY MEDICINE | Facility: CLINIC | Age: 59
End: 2024-01-15
Payer: COMMERCIAL

## 2024-01-15 NOTE — TELEPHONE ENCOUNTER
I would like to see this patient for physical in the next 3 to 4 weeks.  Please schedule that.  Okay to use a 8 AM spot.  If not available let me know.    Thank you.    Hossein Farah M.D.

## 2024-01-25 ENCOUNTER — OFFICE VISIT (OUTPATIENT)
Dept: FAMILY MEDICINE | Facility: CLINIC | Age: 59
End: 2024-01-25
Payer: COMMERCIAL

## 2024-01-25 VITALS
SYSTOLIC BLOOD PRESSURE: 130 MMHG | BODY MASS INDEX: 36.88 KG/M2 | RESPIRATION RATE: 16 BRPM | DIASTOLIC BLOOD PRESSURE: 78 MMHG | HEART RATE: 72 BPM | HEIGHT: 64 IN | OXYGEN SATURATION: 100 % | WEIGHT: 216 LBS | TEMPERATURE: 97.7 F

## 2024-01-25 DIAGNOSIS — R56.9 SEIZURE (H): ICD-10-CM

## 2024-01-25 DIAGNOSIS — Z00.00 ENCOUNTER FOR ANNUAL PHYSICAL EXAM: Primary | ICD-10-CM

## 2024-01-25 DIAGNOSIS — I10 BENIGN ESSENTIAL HYPERTENSION: ICD-10-CM

## 2024-01-25 DIAGNOSIS — R79.89 ELEVATED SERUM CREATININE: ICD-10-CM

## 2024-01-25 DIAGNOSIS — E78.5 HYPERLIPIDEMIA LDL GOAL <100: ICD-10-CM

## 2024-01-25 DIAGNOSIS — R56.9 CONVULSIONS, UNSPECIFIED CONVULSION TYPE (H): ICD-10-CM

## 2024-01-25 DIAGNOSIS — I25.84 CORONARY ARTERY DISEASE DUE TO CALCIFIED CORONARY LESION: ICD-10-CM

## 2024-01-25 DIAGNOSIS — E66.01 MORBID OBESITY (H): ICD-10-CM

## 2024-01-25 DIAGNOSIS — I25.10 CORONARY ARTERY DISEASE DUE TO CALCIFIED CORONARY LESION: ICD-10-CM

## 2024-01-25 DIAGNOSIS — R73.9 ELEVATED BLOOD SUGAR: ICD-10-CM

## 2024-01-25 LAB
ERYTHROCYTE [DISTWIDTH] IN BLOOD BY AUTOMATED COUNT: 12.6 % (ref 10–15)
HBA1C MFR BLD: 5.9 % (ref 0–5.6)
HCT VFR BLD AUTO: 46.3 % (ref 40–53)
HGB BLD-MCNC: 14.9 G/DL (ref 13.3–17.7)
MCH RBC QN AUTO: 30.3 PG (ref 26.5–33)
MCHC RBC AUTO-ENTMCNC: 32.2 G/DL (ref 31.5–36.5)
MCV RBC AUTO: 94 FL (ref 78–100)
PLATELET # BLD AUTO: 286 10E3/UL (ref 150–450)
RBC # BLD AUTO: 4.92 10E6/UL (ref 4.4–5.9)
WBC # BLD AUTO: 6.7 10E3/UL (ref 4–11)

## 2024-01-25 PROCEDURE — 80061 LIPID PANEL: CPT | Performed by: INTERNAL MEDICINE

## 2024-01-25 PROCEDURE — 36415 COLL VENOUS BLD VENIPUNCTURE: CPT | Performed by: INTERNAL MEDICINE

## 2024-01-25 PROCEDURE — 85027 COMPLETE CBC AUTOMATED: CPT | Performed by: INTERNAL MEDICINE

## 2024-01-25 PROCEDURE — 80053 COMPREHEN METABOLIC PANEL: CPT | Performed by: INTERNAL MEDICINE

## 2024-01-25 PROCEDURE — 99396 PREV VISIT EST AGE 40-64: CPT | Performed by: INTERNAL MEDICINE

## 2024-01-25 PROCEDURE — G0103 PSA SCREENING: HCPCS | Performed by: INTERNAL MEDICINE

## 2024-01-25 PROCEDURE — 83036 HEMOGLOBIN GLYCOSYLATED A1C: CPT | Performed by: INTERNAL MEDICINE

## 2024-01-25 PROCEDURE — 99214 OFFICE O/P EST MOD 30 MIN: CPT | Mod: 25 | Performed by: INTERNAL MEDICINE

## 2024-01-25 RX ORDER — HYDROCHLOROTHIAZIDE 25 MG/1
25 TABLET ORAL DAILY
Qty: 90 TABLET | Refills: 3 | Status: SHIPPED | OUTPATIENT
Start: 2024-01-25

## 2024-01-25 RX ORDER — AMLODIPINE BESYLATE 5 MG/1
TABLET ORAL
Qty: 180 TABLET | Refills: 3 | Status: SHIPPED | OUTPATIENT
Start: 2024-01-25

## 2024-01-25 RX ORDER — LEVETIRACETAM 500 MG/1
TABLET ORAL
Qty: 540 TABLET | Refills: 3 | Status: SHIPPED | OUTPATIENT
Start: 2024-01-25

## 2024-01-25 RX ORDER — ATORVASTATIN CALCIUM 40 MG/1
40 TABLET, FILM COATED ORAL DAILY
Qty: 90 TABLET | Refills: 3 | Status: SHIPPED | OUTPATIENT
Start: 2024-01-25

## 2024-01-25 ASSESSMENT — PAIN SCALES - GENERAL: PAINLEVEL: NO PAIN (0)

## 2024-01-25 NOTE — LETTER
[address] [facility]  [address]  [place]  [address]  Tel: [phone]    January 29, 2024    Allen Bills  [facility] [id number]        Dear Mr. Melgarmerlysakshi,    The labs should be enclosed for your records.  As we discussed, please start the weight loss medication and follow-up with me in 4 months.     Hossein         Enclosure: Lab Results  Results for orders placed or performed in visit on 01/25/24   CBC with platelets     Status: Normal   Result Value Ref Range    WBC Count 6.7 4.0 - 11.0 10e3/uL    RBC Count 4.92 4.40 - 5.90 10e6/uL    Hemoglobin 14.9 13.3 - 17.7 g/dL    Hematocrit 46.3 40.0 - 53.0 %    MCV 94 78 - 100 fL    MCH 30.3 26.5 - 33.0 pg    MCHC 32.2 31.5 - 36.5 g/dL    RDW 12.6 10.0 - 15.0 %    Platelet Count 286 150 - 450 10e3/uL   Comprehensive metabolic panel     Status: Abnormal   Result Value Ref Range    Sodium 138 135 - 145 mmol/L    Potassium 3.8 3.4 - 5.3 mmol/L    Carbon Dioxide (CO2) 27 22 - 29 mmol/L    Anion Gap 12 7 - 15 mmol/L    Urea Nitrogen 16.1 8.0 - 23.0 mg/dL    Creatinine 1.45 (H) 0.67 - 1.17 mg/dL    GFR Estimate 55 (L) >60 mL/min/1.73m2    Calcium 9.7 8.8 - 10.2 mg/dL    Chloride 99 98 - 107 mmol/L    Glucose 96 70 - 99 mg/dL    Alkaline Phosphatase 71 40 - 150 U/L    AST 49 (H) 0 - 45 U/L    ALT 56 0 - 70 U/L    Protein Total 7.1 6.4 - 8.3 g/dL    Albumin 4.5 3.5 - 5.2 g/dL    Bilirubin Total 0.8 <=1.2 mg/dL   Lipid panel reflex to direct LDL Fasting     Status: None   Result Value Ref Range    Cholesterol 110 <200 mg/dL    Triglycerides 62 <150 mg/dL    Direct Measure HDL 46 >=40 mg/dL    LDL Cholesterol Calculated 52 <=100 mg/dL    Non HDL Cholesterol 64 <130 mg/dL    Patient Fasting > 8hrs? Yes     Narrative    Cholesterol  Desirable:  <200 mg/dL    Triglycerides  Normal:  Less than 150 mg/dL  Borderline High:  150-199 mg/dL  High:  200-499 mg/dL  Very High:  Greater than or equal to 500 mg/dL    Direct Measure  HDL  Female:  Greater than or equal to 50 mg/dL   Male:  Greater than or equal to 40 mg/dL    LDL Cholesterol  Desirable:  <100mg/dL  Above Desirable:  100-129 mg/dL   Borderline High:  130-159 mg/dL   High:  160-189 mg/dL   Very High:  >= 190 mg/dL    Non HDL Cholesterol  Desirable:  130 mg/dL  Above Desirable:  130-159 mg/dL  Borderline High:  160-189 mg/dL  High:  190-219 mg/dL  Very High:  Greater than or equal to 220 mg/dL   Prostate Specific Antigen Screen     Status: Normal   Result Value Ref Range    Prostate Specific Antigen Screen 1.08 0.00 - 4.50 ng/mL    Narrative    This result is obtained using the Roche Elecsys total PSA method on the ailyn [id number] immunoassay analyzer. Results obtained with different assay methods or kits cannot be used interchangeably.   Hemoglobin A1c     Status: Abnormal   Result Value Ref Range    Hemoglobin A1C 5.9 (H) 0.0 - 5.6 %

## 2024-01-25 NOTE — PATIENT INSTRUCTIONS
Get the stress test    Start the weight loss medicine Wegovy.  The dose starts at 0.25mg injected once weekly for 4 weeks.  After 4 weeks change to the 0.5mg once weekly for 4 weeks.  After 4 weeks change to the 1mg dose and stay on that.  Once you have been on the 1mg dose for 3 weeks send me a message and let me know how it is working and what your weight is.  If you have side effects stop it and let me know.    Hossein

## 2024-01-25 NOTE — PROGRESS NOTES
Preventive Care Visit  [facility] SOFY Farah MD, Internal Medicine  Jan 25, 2024      SUBJECTIVE:    is a 58 year old, presenting for the following:    Overall he is doing well and is working out some but less.  Occasionally he can.  Fleeting pain in the left arm.  It comes and goes.  Is not exertional.  No chest pain.  No shortness of breath.  No seizures.  His blood pressure is good.  He has an elevated sugar and his weight is an issue.    As noted he has a history of cardiovascular disease based on prior imaging.  His last stress test was a year ago but it was not a stress echo.    He is  and has 3 girls.  His wife works as an executive for [employer], the patient himself is retired    No chief complaint on file.      Healthy Habits:     Getting at least 3 servings of Calcium per day:  NO    Bi-annual eye exam:  Yes    Dental care twice a year:  NO    Sleep apnea or symptoms of sleep apnea:  None    Diet:  Low fat/cholesterol    Frequency of exercise:  6-7 days/week    Duration of exercise:  45-60 minutes    Taking medications regularly:  Yes    Barriers to taking medications:  None    Medication side effects:  None    Additional concerns today:  Yes               Past Medical History:      Past Medical History:   Diagnosis Date    ASCVD (arteriosclerotic cardiovascular disease) 2018    seen on cta 2018, rca and lad; fu est 1/2023 nl    AVM (arteriovenous malformation) brain 2005    surgery [place]    Elevated blood sugar     Elevated serum creatinine     Flank pain 04/2017    ct abd and pelvis nl    H/O colonoscopy 04/2016    int hem otherwise nl    Hearing loss in left ear     HTN (hypertension) 2011    added norvasc 6/16    Male erectile disorder 2019    Nephrolithiasis 2005 and 2006    Dr. Chaparro    Normal echocardiogram 2006    Normal stress echocardiogram 2006, 2015, 8/17,11/19    nl also 1/2023    Numbness 2006    mri neg    Palpitations 2011    est echo nl [facility]  8/31/11    Screening 2011, 2018    ebct score 218 in 2018, seen by Dr. Jimenez 2011; cta done 8/18 with some cad and higher calcium score    Seizure (H) 2006    right sided numbness, Dr. Rosales    Tinnitus of left ear 2005             Past Surgical History:      Past Surgical History:   Procedure Laterality Date    LITHOTRIPSY  2005    RUST BRAIN AVM SURG,DURAL,COMPLX  2005    [place]             Social History:     Social History     Socioeconomic History    Marital status:      Spouse name: Not on file    Number of children: 3    Years of education: Not on file    Highest education level: Not on file   Occupational History    Occupation: marketing and sales   Tobacco Use    Smoking status: Never    Smokeless tobacco: Never   Substance and Sexual Activity    Alcohol use: Yes     Alcohol/week: 0.0 standard drinks of alcohol     Comment: 2 drinks monthly    Drug use: No    Sexual activity: Yes     Partners: Female   Other Topics Concern    Parent/sibling w/ CABG, MI or angioplasty before 65F 55M? Not Asked   Social History Narrative    Not on file     Social Determinants of Health     Financial Resource Strain: Not on file   Food Insecurity: Not on file   Transportation Needs: Not on file   Physical Activity: Not on file   Stress: Not on file   Social Connections: Not on file   Interpersonal Safety: Not on file   Housing Stability: Not on file             Family History:   reviewed         Allergies:   No Known Allergies          Medications:     Current Outpatient Medications   Medication Sig Dispense Refill    amLODIPine (NORVASC) 5 MG tablet TAKE 1 TABLET BY MOUTH TWICE A  tablet 3    Ascorbic Acid (MARCOS-C PO) Take 1,000 mg by mouth      aspirin 81 MG tablet Take 1 tablet by mouth daily.      atorvastatin (LIPITOR) 40 MG tablet Take 1 tablet (40 mg) by mouth daily 90 tablet 3    Cholecalciferol (VITAMIN D3 PO) Take 100 mcg by mouth daily      Coenzyme Q10 (COQ10) 200 MG CAPS       hydrochlorothiazide  "(HYDRODIURIL) 25 MG tablet Take 1 tablet (25 mg) by mouth daily 90 tablet 3    levETIRAcetam (KEPPRA) 500 MG tablet TAKE 3 TABS BY MOUTH TWO TIMES DAILY 540 tablet 3    loratadine 10 MG capsule Take 10 mg by mouth daily.      Omega-3 Fatty Acids (FISH OIL) 1200 MG capsule Take 1,200 mg by mouth daily      Probiotic Product (PRO-BIOTIC BLEND PO)       Pyridoxine HCl (VITAMIN B6 PO) Take 100 mg by mouth daily      Semaglutide-Weight Management (WEGOVY) 0.25 MG/0.5ML pen Inject 0.25 mg Subcutaneous once a week 2 mL 0    Semaglutide-Weight Management (WEGOVY) 0.5 MG/0.5ML pen Inject 0.5 mg Subcutaneous once a week 2 mL 0    Semaglutide-Weight Management (WEGOVY) 1 MG/0.5ML pen Inject 1 mg Subcutaneous once a week 2 mL 0    UNABLE TO FIND MEDICATION NAME: triple flex                 Review of Systems:     The 10 point Review of Systems is negative other than noted in the HPI           Physical Exam:   Blood pressure 130/78, pulse 72, temperature 97.7  F (36.5  C), temperature source Temporal, resp. rate 16, height 1.62 m (5' 3.78\"), weight 98 kg (216 lb), SpO2 100%.    Exam:  Constitutional: healthy appearing, alert and in no distress  Heent: Normocephalic. Head without obvious masses or lesions. PERRLDC, EOMI. Mouth exam within normal limits: tongue, mucous membranes, posterior pharynx all normal, no lesions or abnormalities seen.  Tm's and canals within normal limits bilaterally. Neck supple, no nuchal rigidity or masses. No supraclavicular, or cervical adenopathy. Thyroid symmetric, no masses.  Cardiovascular: Regular rate and rhythm, no murmer, rub or gallops.  JVP not elevated, no edema.  Carotids within normal limits bilaterally, no bruits.  Respiratory: Normal respiratory effort.  Lungs clear, normal flow, no wheezing or crackles.  Breasts: Normal bilaterally.  No masses or lesions.  Nipples within normal limites.  No axillary lesions or nodes.  Gastrointestinal: Normal active bowel sounds.   Soft, not tender, no " masses, guarding or rebound.  No hepatosplenomegaly.   Genitourinary: Rectal not done  Musculoskeletal: extremities normal, no gross deformities noted.  Skin: no suspicious lesions or rashes   Neurologic: Mental status within normal limits.  Speech fluent.  No gross motor abnormalities and gait intact.  Psychiatric: mentation appears normal and affect normal.         Data:   Labs sent        Assessment:   Normal complete physical exam  Morbid obesity, he has tried and failed multiple diets and exercise and I think he would be an ideal candidate for a GLP-1 agent.  I discussed the agents with him and the risks and he understands  Seizures, no issues  Hypertension, controlled  Coronary artery disease, I doubt his symptoms are at all coronary but we will do a stress test  Hyperlipidemia, on statin therapy  Elevated creatinine, follow-up labs  Elevated sugar, follow-up labs  Healthcare maintenance         Plan:   Up-to-date colon and immunizations  Stress echo  Letter with labs  Exercise and diet  Trial of Wegovy, dose titration, once at the 1 mg dose he will let me know how he is doing.  If he has side effects he will stop it and call      Hossein Farah M.D.

## 2024-01-26 LAB
ALBUMIN SERPL BCG-MCNC: 4.5 G/DL (ref 3.5–5.2)
ALP SERPL-CCNC: 71 U/L (ref 40–150)
ALT SERPL W P-5'-P-CCNC: 56 U/L (ref 0–70)
ANION GAP SERPL CALCULATED.3IONS-SCNC: 12 MMOL/L (ref 7–15)
AST SERPL W P-5'-P-CCNC: 49 U/L (ref 0–45)
BILIRUB SERPL-MCNC: 0.8 MG/DL
BUN SERPL-MCNC: 16.1 MG/DL (ref 8–23)
CALCIUM SERPL-MCNC: 9.7 MG/DL (ref 8.8–10.2)
CHLORIDE SERPL-SCNC: 99 MMOL/L (ref 98–107)
CHOLEST SERPL-MCNC: 110 MG/DL
CREAT SERPL-MCNC: 1.45 MG/DL (ref 0.67–1.17)
DEPRECATED HCO3 PLAS-SCNC: 27 MMOL/L (ref 22–29)
EGFRCR SERPLBLD CKD-EPI 2021: 55 ML/MIN/1.73M2
FASTING STATUS PATIENT QL REPORTED: YES
GLUCOSE SERPL-MCNC: 96 MG/DL (ref 70–99)
HDLC SERPL-MCNC: 46 MG/DL
LDLC SERPL CALC-MCNC: 52 MG/DL
NONHDLC SERPL-MCNC: 64 MG/DL
POTASSIUM SERPL-SCNC: 3.8 MMOL/L (ref 3.4–5.3)
PROT SERPL-MCNC: 7.1 G/DL (ref 6.4–8.3)
PSA SERPL DL<=0.01 NG/ML-MCNC: 1.08 NG/ML (ref 0–4.5)
SODIUM SERPL-SCNC: 138 MMOL/L (ref 135–145)
TRIGL SERPL-MCNC: 62 MG/DL

## 2024-01-28 ENCOUNTER — TELEPHONE (OUTPATIENT)
Dept: FAMILY MEDICINE | Facility: CLINIC | Age: 59
End: 2024-01-28
Payer: COMMERCIAL

## 2024-01-28 NOTE — TELEPHONE ENCOUNTER
Please call patient to schedule follow up fasting office visit with me in 4 months.    Hossein Farah M.D.

## 2024-01-28 NOTE — TELEPHONE ENCOUNTER
I called patient. Re his labs.  Exercise, diet, weight loss.  No nsaids, follow up office visit 4 months.    Hossein Farah M.D.

## 2024-01-29 NOTE — RESULT ENCOUNTER NOTE
Al,    The labs should be enclosed for your records.  As we discussed, please start the weight loss medication and follow-up with me in 4 months.    Hossein

## 2024-01-29 NOTE — TELEPHONE ENCOUNTER
Called patient to set up follow up appointment:    5/28/24 at 10:10 AM check in time with Dr. Sofi Ortega CMA on 1/29/2024 at 11:12 AM

## 2024-01-30 ENCOUNTER — TELEPHONE (OUTPATIENT)
Dept: FAMILY MEDICINE | Facility: CLINIC | Age: 59
End: 2024-01-30
Payer: COMMERCIAL

## 2024-01-30 DIAGNOSIS — E66.01 MORBID OBESITY (H): Primary | ICD-10-CM

## 2024-01-30 DIAGNOSIS — I10 BENIGN ESSENTIAL HYPERTENSION: ICD-10-CM

## 2024-01-30 DIAGNOSIS — E78.5 HYPERLIPIDEMIA LDL GOAL <100: ICD-10-CM

## 2024-01-30 NOTE — TELEPHONE ENCOUNTER
Prior Authorization Retail Medication Request    Medication/Dose: Wegovy 0.25 mg/0.5mL  Diagnosis and ICD code (if different than what is on RX):  E66.01  New/renewal/insurance change PA/secondary ins. PA:  Previously Tried and Failed:  None  Rationale:  Patient with BMI of 37.33 with elevated blood sugar and comorbidity of hypertension and hyperlipidemia with coronary artery disease    Insurance   Primary: OPTUM_IRX  Insurance ID:  [id number]    Secondary (if applicable):  Insurance ID:      Pharmacy Information (if different than what is on RX)  Name:  [id number]  Phone:  [phone]  Fax:[phone]

## 2024-02-02 NOTE — TELEPHONE ENCOUNTER
-Per [employer] PHCY:     -WeGovy PA has been DENIED.    -Requesting prescription for Zepbound, as that may be covered.    PHCY Pending--

## 2024-02-05 ENCOUNTER — TELEPHONE (OUTPATIENT)
Dept: FAMILY MEDICINE | Facility: CLINIC | Age: 59
End: 2024-02-05

## 2024-02-05 NOTE — TELEPHONE ENCOUNTER
Retail Pharmacy Prior Authorization Team   Phone: [phone]        PRIOR AUTHORIZATION DENIED    Medication: ZEPBOUND 2.5 MG/0.5ML SC SOAJ  Insurance Company: "[employer] ([facility]) - Phone [phone] Fax [phone]  Denial Date: 2/2/2024  Denial Reason(s):         Appeal Information: No appeal is offered with the insurance as this medication/class of medication is excluded and there are no additional authorizations offered that would result in an approval.     Patient Notified: No. Please note: Providers/Clinics are to notify the patients of the denial outcomes and steps going forward.

## 2024-05-26 ENCOUNTER — TELEPHONE (OUTPATIENT)
Dept: FAMILY MEDICINE | Facility: CLINIC | Age: 59
End: 2024-05-26
Payer: COMMERCIAL

## 2024-05-26 DIAGNOSIS — I10 BENIGN ESSENTIAL HYPERTENSION: ICD-10-CM

## 2024-05-26 DIAGNOSIS — E78.5 HYPERLIPIDEMIA LDL GOAL <100: ICD-10-CM

## 2024-05-26 DIAGNOSIS — E66.01 MORBID OBESITY (H): ICD-10-CM

## 2024-05-26 NOTE — TELEPHONE ENCOUNTER
Having dosing issues with zepbound due to availability.  Will try lower dose.    Hossein Farah M.D.

## 2024-05-28 ENCOUNTER — OFFICE VISIT (OUTPATIENT)
Dept: FAMILY MEDICINE | Facility: CLINIC | Age: 59
End: 2024-05-28
Payer: COMMERCIAL

## 2024-05-28 VITALS
WEIGHT: 180.3 LBS | OXYGEN SATURATION: 98 % | TEMPERATURE: 96.9 F | BODY MASS INDEX: 30.78 KG/M2 | HEART RATE: 54 BPM | HEIGHT: 64 IN | RESPIRATION RATE: 16 BRPM | SYSTOLIC BLOOD PRESSURE: 122 MMHG | DIASTOLIC BLOOD PRESSURE: 67 MMHG

## 2024-05-28 DIAGNOSIS — R79.89 ELEVATED SERUM CREATININE: Primary | ICD-10-CM

## 2024-05-28 DIAGNOSIS — E78.5 HYPERLIPIDEMIA LDL GOAL <100: ICD-10-CM

## 2024-05-28 DIAGNOSIS — I10 BENIGN ESSENTIAL HYPERTENSION: ICD-10-CM

## 2024-05-28 DIAGNOSIS — E66.01 MORBID OBESITY (H): ICD-10-CM

## 2024-05-28 LAB
ALBUMIN SERPL BCG-MCNC: 4.5 G/DL (ref 3.5–5.2)
ALP SERPL-CCNC: 67 U/L (ref 40–150)
ALT SERPL W P-5'-P-CCNC: 30 U/L (ref 0–70)
ANION GAP SERPL CALCULATED.3IONS-SCNC: 10 MMOL/L (ref 7–15)
AST SERPL W P-5'-P-CCNC: 30 U/L (ref 0–45)
BILIRUB SERPL-MCNC: 0.5 MG/DL
BUN SERPL-MCNC: 13.1 MG/DL (ref 8–23)
CALCIUM SERPL-MCNC: 9.8 MG/DL (ref 8.8–10.2)
CHLORIDE SERPL-SCNC: 103 MMOL/L (ref 98–107)
CREAT SERPL-MCNC: 1.25 MG/DL (ref 0.67–1.17)
CREAT UR-MCNC: 145 MG/DL
DEPRECATED HCO3 PLAS-SCNC: 27 MMOL/L (ref 22–29)
EGFRCR SERPLBLD CKD-EPI 2021: 66 ML/MIN/1.73M2
GLUCOSE SERPL-MCNC: 107 MG/DL (ref 70–99)
MICROALBUMIN UR-MCNC: <12 MG/L
MICROALBUMIN/CREAT UR: NORMAL MG/G{CREAT}
POTASSIUM SERPL-SCNC: 3.8 MMOL/L (ref 3.4–5.3)
PROT SERPL-MCNC: 6.9 G/DL (ref 6.4–8.3)
SODIUM SERPL-SCNC: 140 MMOL/L (ref 135–145)

## 2024-05-28 PROCEDURE — 82570 ASSAY OF URINE CREATININE: CPT | Performed by: INTERNAL MEDICINE

## 2024-05-28 PROCEDURE — 80053 COMPREHEN METABOLIC PANEL: CPT | Performed by: INTERNAL MEDICINE

## 2024-05-28 PROCEDURE — 36415 COLL VENOUS BLD VENIPUNCTURE: CPT | Performed by: INTERNAL MEDICINE

## 2024-05-28 PROCEDURE — 82043 UR ALBUMIN QUANTITATIVE: CPT | Performed by: INTERNAL MEDICINE

## 2024-05-28 PROCEDURE — 99213 OFFICE O/P EST LOW 20 MIN: CPT | Performed by: INTERNAL MEDICINE

## 2024-05-28 ASSESSMENT — PAIN SCALES - GENERAL: PAINLEVEL: NO PAIN (0)

## 2024-05-28 NOTE — PROGRESS NOTES
This is a follow-up for this patient with several issues.    He has a history of elevated creatinine.  I will get follow-up on this today as well as check his urine.    He has morbid obesity.  We added Zepbound and he was able to start in about 3 months ago with significant weight loss, 40 pounds or so.  Occasional difficulty in the GI realm with constipation and some nausea.  Unfortunately, due to dosing issues he was unable to get yet and has been off of it for 3 weeks now.  He was up to 7.5 mg and doing quite well.    He has not had a stress test yet.  He does not have chest pain or shortness of breath.  His blood pressure has been very good.  In fact when his weight was down more he was getting slightly lightheaded when standing.  He is on hydrochlorothiazide due to his renal stones and he is also on amlodipine.  He has high cholesterol and takes Lipitor and his last lipid panel looked good.    Past Medical History:   Diagnosis Date    ASCVD (arteriosclerotic cardiovascular disease) 2018    seen on cta 2018, rca and lad; fu est 1/2023 nl    AVM (arteriovenous malformation) brain 2005    surgery [place]    Elevated blood sugar     Elevated serum creatinine     Flank pain 04/2017    ct abd and pelvis nl    H/O colonoscopy 04/2016    int hem otherwise nl    Hearing loss in left ear     HTN (hypertension) 2011    added norvasc 6/16    Male erectile disorder 2019    Morbid obesity (H)     added glp1 Jan 2024    Nephrolithiasis 2005 and 2006    Dr. Chaparro    Normal echocardiogram 2006    Normal stress echocardiogram 2006, 2015, 8/17,11/19    nl also 1/2023    Numbness 2006    mri neg    Palpitations 2011    est echo nl [facility] 8/31/11    Screening 2011, 2018    ebct score 218 in 2018, seen by Dr. Jimenez 2011; cta done 8/18 with some cad and higher calcium score    Seizure (H) 2006    right sided numbness, Dr. Rosales    Tinnitus of left ear 2005     Past Surgical History:   Procedure Laterality Date     LITHOTRIPSY  2005    [facility] BRAIN AVM SURG,DURAL,COMPLX  2005    [place]     Social History     Socioeconomic History    Marital status:      Spouse name: Not on file    Number of children: 3    Years of education: Not on file    Highest education level: Not on file   Occupational History    Occupation: marketing and sales   Tobacco Use    Smoking status: Never    Smokeless tobacco: Never   Substance and Sexual Activity    Alcohol use: Yes     Alcohol/week: 0.0 standard drinks of alcohol     Comment: 2 drinks monthly    Drug use: No    Sexual activity: Yes     Partners: Female   Other Topics Concern    Parent/sibling w/ CABG, MI or angioplasty before 65F 55M? Not Asked   Social History Narrative    Not on file     Social Determinants of Health     Financial Resource Strain: High Risk (1/1/2022)    [facility]    Financial Resource Strain     Difficulty of Paying Living Expenses: Not on file     Difficulty of Paying Living Expenses: Not on file   Food Insecurity: Not on file   Transportation Needs: Not on file   Physical Activity: Not on file   Stress: Not on file   Social Connections: Unknown (1/1/2022)    [facility]    Social Connections     Frequency of Communication with Friends and Family: Not on file   Interpersonal Safety: Low Risk  (5/28/2024)    Interpersonal Safety     Do you feel physically and emotionally safe where you currently live?: Yes     Within the past 12 months, have you been hit, slapped, kicked or otherwise physically hurt by someone?: No     Within the past 12 months, have you been humiliated or emotionally abused in other ways by your partner or ex-partner?: No   Housing Stability: Not on file     Current Outpatient Medications   Medication Sig Dispense Refill    amLODIPine (NORVASC) 5 MG tablet TAKE 1  "TABLET BY MOUTH TWICE A  tablet 3    Ascorbic Acid (MARCOS-C PO) Take 1,000 mg by mouth      aspirin 81 MG tablet Take 1 tablet by mouth daily.      atorvastatin (LIPITOR) 40 MG tablet Take 1 tablet (40 mg) by mouth daily 90 tablet 3    Cholecalciferol (VITAMIN D3 PO) Take 100 mcg by mouth daily      Coenzyme Q10 (COQ10) 200 MG CAPS       hydrochlorothiazide (HYDRODIURIL) 25 MG tablet Take 1 tablet (25 mg) by mouth daily 90 tablet 3    levETIRAcetam (KEPPRA) 500 MG tablet TAKE 3 TABS BY MOUTH TWO TIMES DAILY 540 tablet 3    loratadine 10 MG capsule Take 10 mg by mouth daily.      Omega-3 Fatty Acids (FISH OIL) 1200 MG capsule Take 1,200 mg by mouth daily      Probiotic Product (PRO-BIOTIC BLEND PO)       Pyridoxine HCl (VITAMIN B6 PO) Take 100 mg by mouth daily      tirzepatide-Weight Management (ZEPBOUND) 10 MG/0.5ML prefilled pen Inject 0.5 mLs (10 mg) Subcutaneous every 7 days 2 mL 1    tirzepatide-Weight Management (ZEPBOUND) 2.5 MG/0.5ML prefilled pen Inject 0.5 mLs (2.5 mg) Subcutaneous every 7 days Start at 2.5 mg injected once weekly for 4 weeks then santizo to 5 mg injected once weekly for 4 weeks then santizo to 7.5 mg injected once weekly for 4 weeks then santizo to 10 mg injected once weekly and stay on that 2 mL 0    tirzepatide-Weight Management (ZEPBOUND) 5 MG/0.5ML prefilled pen Inject 0.5 mLs (5 mg) Subcutaneous every 7 days 2 mL 2    tirzepatide-Weight Management (ZEPBOUND) 7.5 MG/0.5ML prefilled pen Inject 0.5 mLs (7.5 mg) Subcutaneous every 7 days 2 mL 2    UNABLE TO FIND MEDICATION NAME: triple flex       No Known Allergies  FAMILY HISTORY NOTED AND REVIEWED    REVIEW OF SYSTEMS: above    PHYSICAL EXAM    /67 (BP Location: Right arm, Patient Position: Sitting)   Pulse 54   Temp 96.9  F (36.1  C) (Temporal)   Resp 16   Ht 1.62 m (5' 3.78\")   Wt 81.8 kg (180 lb 4.8 oz)   SpO2 98%   BMI 31.16 kg/m      Patient appears non toxic  Lungs clear  Cv reglar rate and rhythm    Labs " sent    ASSESSMENT:  Morbid obesity, doing super with weight loss GLP-1 agent.  He has been off of it as he could not get it for 3 weeks and will resume it once he can get the 5 mg dose.  It seems as if he will not need the maximum dose.  Once he hits his goal weight I think we can in fact go down to 5 mg and hopefully even 2.5 but we will have to see  Elevated creatinine, labs today  Elevated liver test, labs today  Hypertension, controlled, with weight loss we may need to decrease his med, he will monitor it    PLAN:  Labs  As above    Hossein Farah M.D.

## 2024-05-28 NOTE — RESULT ENCOUNTER NOTE
Al,    Your tests look good, the creatinine or kidney test is very good at 1.25 and very stable compared with prior ones.  All the other labs are normal.  The elevated sugar I am not worried about.    Please let me know if you have questions.

## 2024-06-04 ENCOUNTER — HOSPITAL ENCOUNTER (OUTPATIENT)
Dept: CARDIOLOGY | Facility: CLINIC | Age: 59
Discharge: HOME | End: 2024-06-04
Attending: INTERNAL MEDICINE | Admitting: INTERNAL MEDICINE
Payer: COMMERCIAL

## 2024-06-04 DIAGNOSIS — I25.10 CORONARY ARTERY DISEASE DUE TO CALCIFIED CORONARY LESION: ICD-10-CM

## 2024-06-04 DIAGNOSIS — I25.84 CORONARY ARTERY DISEASE DUE TO CALCIFIED CORONARY LESION: ICD-10-CM

## 2024-06-04 PROCEDURE — 93321 DOPPLER ECHO F-UP/LMTD STD: CPT | Mod: 26 | Performed by: INTERNAL MEDICINE

## 2024-06-04 PROCEDURE — 93325 DOPPLER ECHO COLOR FLOW MAPG: CPT | Mod: 26 | Performed by: INTERNAL MEDICINE

## 2024-06-04 PROCEDURE — 93325 DOPPLER ECHO COLOR FLOW MAPG: CPT | Mod: TC

## 2024-06-04 PROCEDURE — 93350 STRESS TTE ONLY: CPT | Mod: 26 | Performed by: INTERNAL MEDICINE

## 2024-06-04 PROCEDURE — 93016 CV STRESS TEST SUPVJ ONLY: CPT | Performed by: INTERNAL MEDICINE

## 2024-06-04 PROCEDURE — 93018 CV STRESS TEST I&R ONLY: CPT | Performed by: INTERNAL MEDICINE

## 2024-06-04 NOTE — LETTER
2024      Allen Anthony  [address] [address]        Dear ,    We are writing to inform you of your test results.    Your stress test looks great, no problems.  Please let me know if you have questions.     Resulted Orders   Echocardiogram Exercise Stress    Narrative    [id number]  [id number]  [id number]  [id number]^RUFINO^GALE^JEFFY     [facility]  [facility]  [address]  [address]  [place] [address]  Phone [phone]  Fax [phone]     Name: ALLEN ANTHONY  MRN: [id number]  : 1965  Study Date: 2024 09:03 AM  Age: 58 yrs  Gender: Male  Patient Location: [facility]  Reason For Study: Coronary artery disease due to calcified coronary lesion,  [place]  Ordering Physician: GALE JOY  Referring Physician: GALE JOY  Performed By: Kyung Gonzalez RDCS     BSA: 1.8 m2  Height: 63 in  Weight: 180 lb  HR: 75  BP: 136/74 mmHg  ______________________________________________________________________________  Procedure  Stress Echo Complete.     ______________________________________________________________________________  Interpretation Summary  The patient exhibited no chest pain during exercise.  The Duke treadmill score was low risk ( >5 Duke score).  This was a normal stress echocardiogram with no evidence of stress-induced  ischemia. This was a normal stress EKG with no evidence of stress-induced  ischemia.  ______________________________________________________________________________  Stress  The patient exercised 10:00 min.  A moderately-high workload was achieved.  RPP [id number].  Exercise was stopped due to fatigue.  The patient did not exhibit any symptoms during exercise.  There was a normal BP response to exercise.  The patient exhibited no chest pain during exercise.  A treadmill exercise test according to the Stephen protocol was performed.  Target Heart Rate was achieved.  The Duke  treadmill score was low risk ( >5 Peterson score).  Arrhythmia induced during stress: occasional PVC's.  The EKG portion of this stress test was negative for inducible ischemia (see  echo results below).  The visual ejection fraction is >70%.  Left ventricular cavity size decreases with exercise.  Global LV systolic function augments with exercise.  Normal resting wall motion and no stress-induced wall motion abnormality.     Baseline  The patient is in normal sinus rhythm.  Resting ECG is normal.  The visual ejection fraction is estimated at 60-65%.  No regional wall motion abnormalities noted.     Stress Results         Protocol:  Stephen Protocol        Maximum Predicted HR:   160 bpm         Target HR: 136 bpm               % Maximum Predicted HR: 94 %              Duration   Heart    Stage   (mm:ss)   Rate     BP                     Comment                      (bpm)   Stage 1   3:00      101   144/74   Stage 2   3:00      125   156/66   Stage 3   3:00      142   168/62   Stage 4   1:00      150   176/62FAC: above average, Duke score: 10 (low                                   risk)  RecoveryR  5:00      99    153/72             Stress Duration:   10:00 mm:ss        Recovery Time: 5:00 mm:ss          Maximum Stress HR: 150 bpm            METS:          13     Mitral Valve  There is trace mitral regurgitation.     Tricuspid Valve  There is mild (1+) tricuspid regurgitation.     Aortic Valve  The aortic valve is trileaflet. There is trace aortic regurgitation.     Pulmonic Valve  There is trace pulmonic valvular regurgitation.  ______________________________________________________________________________  Report approved by: Jose Elias Rod 06/04/2024 09:47 AM     ______________________________________________________________________________          If you have any questions or concerns, please call the clinic at the number listed above.       Sincerely,      Hossein Farah MD

## 2024-06-04 NOTE — RESULT ENCOUNTER NOTE
Al,    Your stress test looks great, no problems.  Please let me know if you have questions.    Hossein Patient/Mother

## 2024-06-07 ENCOUNTER — TELEPHONE (OUTPATIENT)
Dept: FAMILY MEDICINE | Facility: CLINIC | Age: 59
End: 2024-06-07
Payer: COMMERCIAL

## 2024-06-07 DIAGNOSIS — U07.1 COVID-19 VIRUS INFECTION: Primary | ICD-10-CM

## 2024-06-07 NOTE — TELEPHONE ENCOUNTER
The patient tested positive for COVID today.  His illness started 2 nights before with scratchy throat, then worse yesterday.  Stuffed out, some cough not bad.  Some generalized weakness.  Not sure if has had fever, no ns.  No chest pain or shortness of breath.  No n/v.     I discussed this with the patient.  He does have CKD so he does meet risk criteria.  However, he sounds as if his disease is very mild.  We discussed the potential drug interactions.  I discussed with him that there is not stated as suggested in his specific case whether or not it would be beneficial.  He would prefer to start treatment I think is reasonable.  I will send in treatment for Paxlovid.  I do not believe he needs dose reduction given his last GFR of 66.  He will hold the atorvastatin down for 3 days after it does reduce his amlodipine as well.  He will go to the ER if he has chest pain or shortness of breath.  He will let me know if he is not improving soon.    Hossein Farah M.D.

## 2024-09-12 DIAGNOSIS — I10 BENIGN ESSENTIAL HYPERTENSION: ICD-10-CM

## 2024-09-12 DIAGNOSIS — E66.01 MORBID OBESITY (H): ICD-10-CM

## 2024-09-12 DIAGNOSIS — E78.5 HYPERLIPIDEMIA LDL GOAL <100: ICD-10-CM

## 2024-09-13 RX ORDER — TIRZEPATIDE 10 MG/.5ML
INJECTION, SOLUTION SUBCUTANEOUS
Qty: 4 ML | Refills: 0 | Status: SHIPPED | OUTPATIENT
Start: 2024-09-13

## 2024-10-16 ENCOUNTER — TELEPHONE (OUTPATIENT)
Dept: FAMILY MEDICINE | Facility: CLINIC | Age: 59
End: 2024-10-16
Payer: COMMERCIAL

## 2024-10-16 DIAGNOSIS — E78.5 HYPERLIPIDEMIA LDL GOAL <100: ICD-10-CM

## 2024-10-16 DIAGNOSIS — E66.01 MORBID OBESITY (H): ICD-10-CM

## 2024-10-16 DIAGNOSIS — I10 BENIGN ESSENTIAL HYPERTENSION: ICD-10-CM

## 2024-10-17 NOTE — TELEPHONE ENCOUNTER
Patient needs refill on zepbound.  At his target weight so will try lowering to 7.5mg and seeing what weight down.    Hossein Farah M.D.

## 2024-11-13 ENCOUNTER — TELEPHONE (OUTPATIENT)
Dept: FAMILY MEDICINE | Facility: CLINIC | Age: 59
End: 2024-11-13
Payer: COMMERCIAL

## 2024-11-13 DIAGNOSIS — E66.01 MORBID OBESITY (H): ICD-10-CM

## 2024-11-13 DIAGNOSIS — E78.5 HYPERLIPIDEMIA LDL GOAL <100: ICD-10-CM

## 2024-11-13 DIAGNOSIS — I10 BENIGN ESSENTIAL HYPERTENSION: ICD-10-CM

## 2024-11-13 NOTE — TELEPHONE ENCOUNTER
I discussed with patient his urine symptoms, twice had some difficulty urinating in middle of night, some frequency and urgency, no burning or blood.  Not too bothersome.  For now will call if changes, consider flomax, check urine at complete physical exam January.    Refilled zepbound, he wants to stay on current dose 7.5mg.    Hossein Farah M.D.

## 2024-12-16 ENCOUNTER — DOCUMENTATION ONLY (OUTPATIENT)
Dept: FAMILY MEDICINE | Facility: CLINIC | Age: 59
End: 2024-12-16
Payer: COMMERCIAL

## 2024-12-17 NOTE — PROGRESS NOTES
Lashanda Escoto, PharmD  Kiran Eng MD  This is a pretty standard maintenance dose.  I don't see a Keppra level in his chart since 2017 so may be prudent to get an updated one.  Recommendation is to draw levels before the morning dose  Lashanda Escoto PharmD, [facility]  Medication Therapy Management Provider  [phone]          Previous Messages       ----- Message -----  From: Kiran Eng MD  Sent: 12/13/2024   9:10 AM CST  To: Lashanda Escoto, PharmJAHAIRA    Hi Lashanda, I am covering for Dr Farah.  I have a refill request for levetiracetam 500 mg 3 tablets twice daily, is this is an appropriate dose or do we need to check a Keppra level seems that he has been on such chronically.,  Appreciate your advice.  Kiran

## 2025-01-30 ENCOUNTER — OFFICE VISIT (OUTPATIENT)
Dept: FAMILY MEDICINE | Facility: CLINIC | Age: 60
End: 2025-01-30
Payer: COMMERCIAL

## 2025-01-30 VITALS
HEIGHT: 64 IN | HEART RATE: 58 BPM | SYSTOLIC BLOOD PRESSURE: 131 MMHG | BODY MASS INDEX: 25.27 KG/M2 | TEMPERATURE: 97.3 F | WEIGHT: 148 LBS | DIASTOLIC BLOOD PRESSURE: 77 MMHG | RESPIRATION RATE: 16 BRPM | OXYGEN SATURATION: 99 %

## 2025-01-30 DIAGNOSIS — R39.198 SLOW URINARY STREAM: ICD-10-CM

## 2025-01-30 DIAGNOSIS — I25.10 CORONARY ARTERY DISEASE DUE TO CALCIFIED CORONARY LESION: ICD-10-CM

## 2025-01-30 DIAGNOSIS — R73.9 HYPERGLYCEMIA: ICD-10-CM

## 2025-01-30 DIAGNOSIS — Z00.00 ENCOUNTER FOR ANNUAL PHYSICAL EXAM: Primary | ICD-10-CM

## 2025-01-30 DIAGNOSIS — R56.9 CONVULSIONS, UNSPECIFIED CONVULSION TYPE (H): ICD-10-CM

## 2025-01-30 DIAGNOSIS — I25.84 CORONARY ARTERY DISEASE DUE TO CALCIFIED CORONARY LESION: ICD-10-CM

## 2025-01-30 DIAGNOSIS — R42 LIGHT HEADED: ICD-10-CM

## 2025-01-30 DIAGNOSIS — R35.1 NOCTURIA: ICD-10-CM

## 2025-01-30 DIAGNOSIS — I10 BENIGN ESSENTIAL HYPERTENSION: ICD-10-CM

## 2025-01-30 DIAGNOSIS — R22.9 LOCALIZED SUPERFICIAL SWELLING, MASS, OR LUMP: ICD-10-CM

## 2025-01-30 DIAGNOSIS — R79.89 ELEVATED SERUM CREATININE: ICD-10-CM

## 2025-01-30 DIAGNOSIS — E66.01 MORBID OBESITY (H): ICD-10-CM

## 2025-01-30 DIAGNOSIS — R56.9 SEIZURE (H): ICD-10-CM

## 2025-01-30 DIAGNOSIS — E78.5 HYPERLIPIDEMIA LDL GOAL <100: ICD-10-CM

## 2025-01-30 LAB
ALBUMIN SERPL BCG-MCNC: 4.5 G/DL (ref 3.5–5.2)
ALBUMIN UR-MCNC: NEGATIVE MG/DL
ALP SERPL-CCNC: 56 U/L (ref 40–150)
ALT SERPL W P-5'-P-CCNC: 43 U/L (ref 0–70)
ANION GAP SERPL CALCULATED.3IONS-SCNC: 10 MMOL/L (ref 7–15)
APPEARANCE UR: CLEAR
AST SERPL W P-5'-P-CCNC: 38 U/L (ref 0–45)
BILIRUB SERPL-MCNC: 0.5 MG/DL
BILIRUB UR QL STRIP: NEGATIVE
BUN SERPL-MCNC: 22.2 MG/DL (ref 8–23)
CALCIUM SERPL-MCNC: 9.8 MG/DL (ref 8.8–10.4)
CHLORIDE SERPL-SCNC: 103 MMOL/L (ref 98–107)
CHOLEST SERPL-MCNC: 123 MG/DL
COLOR UR AUTO: YELLOW
CREAT SERPL-MCNC: 1.08 MG/DL (ref 0.67–1.17)
CREAT UR-MCNC: 46.9 MG/DL
EGFRCR SERPLBLD CKD-EPI 2021: 78 ML/MIN/1.73M2
ERYTHROCYTE [DISTWIDTH] IN BLOOD BY AUTOMATED COUNT: 12.5 % (ref 10–15)
EST. AVERAGE GLUCOSE BLD GHB EST-MCNC: 105 MG/DL
FASTING STATUS PATIENT QL REPORTED: YES
FASTING STATUS PATIENT QL REPORTED: YES
GLUCOSE SERPL-MCNC: 91 MG/DL (ref 70–99)
GLUCOSE UR STRIP-MCNC: NEGATIVE MG/DL
HBA1C MFR BLD: 5.3 % (ref 0–5.6)
HCO3 SERPL-SCNC: 27 MMOL/L (ref 22–29)
HCT VFR BLD AUTO: 43.5 % (ref 40–53)
HDLC SERPL-MCNC: 66 MG/DL
HGB BLD-MCNC: 14.5 G/DL (ref 13.3–17.7)
HGB UR QL STRIP: NEGATIVE
KETONES UR STRIP-MCNC: NEGATIVE MG/DL
LDLC SERPL CALC-MCNC: 50 MG/DL
LEUKOCYTE ESTERASE UR QL STRIP: NEGATIVE
MCH RBC QN AUTO: 31.5 PG (ref 26.5–33)
MCHC RBC AUTO-ENTMCNC: 33.3 G/DL (ref 31.5–36.5)
MCV RBC AUTO: 94 FL (ref 78–100)
MICROALBUMIN UR-MCNC: <12 MG/L
MICROALBUMIN/CREAT UR: NORMAL MG/G{CREAT}
NITRATE UR QL: NEGATIVE
NONHDLC SERPL-MCNC: 57 MG/DL
PH UR STRIP: 6 [PH] (ref 5–7)
PLATELET # BLD AUTO: 253 10E3/UL (ref 150–450)
POTASSIUM SERPL-SCNC: 4.3 MMOL/L (ref 3.4–5.3)
PROT SERPL-MCNC: 7.1 G/DL (ref 6.4–8.3)
PSA SERPL DL<=0.01 NG/ML-MCNC: 1.06 NG/ML (ref 0–4.5)
RBC # BLD AUTO: 4.61 10E6/UL (ref 4.4–5.9)
SODIUM SERPL-SCNC: 140 MMOL/L (ref 135–145)
SP GR UR STRIP: 1.01 (ref 1–1.03)
TRIGL SERPL-MCNC: 37 MG/DL
UROBILINOGEN UR STRIP-ACNC: 0.2 E.U./DL
WBC # BLD AUTO: 5.5 10E3/UL (ref 4–11)

## 2025-01-30 RX ORDER — HYDROCHLOROTHIAZIDE 25 MG/1
25 TABLET ORAL DAILY
Qty: 90 TABLET | Refills: 3 | Status: SHIPPED | OUTPATIENT
Start: 2025-01-30

## 2025-01-30 RX ORDER — AMLODIPINE BESYLATE 5 MG/1
TABLET ORAL
Qty: 180 TABLET | Refills: 3 | Status: SHIPPED | OUTPATIENT
Start: 2025-01-30

## 2025-01-30 RX ORDER — LEVETIRACETAM 500 MG/1
TABLET ORAL
Qty: 540 TABLET | Refills: 3 | Status: SHIPPED | OUTPATIENT
Start: 2025-01-30

## 2025-01-30 RX ORDER — ATORVASTATIN CALCIUM 40 MG/1
40 TABLET, FILM COATED ORAL DAILY
Qty: 90 TABLET | Refills: 3 | Status: SHIPPED | OUTPATIENT
Start: 2025-01-30

## 2025-01-30 SDOH — HEALTH STABILITY: PHYSICAL HEALTH: ON AVERAGE, HOW MANY DAYS PER WEEK DO YOU ENGAGE IN MODERATE TO STRENUOUS EXERCISE (LIKE A BRISK WALK)?: 7 DAYS

## 2025-01-30 ASSESSMENT — SOCIAL DETERMINANTS OF HEALTH (SDOH): HOW OFTEN DO YOU GET TOGETHER WITH FRIENDS OR RELATIVES?: ONCE A WEEK

## 2025-01-30 ASSESSMENT — PAIN SCALES - GENERAL: PAINLEVEL_OUTOF10: NO PAIN (0)

## 2025-01-30 NOTE — PROGRESS NOTES
Preventive Care Visit  [facility] SOFY Farah MD, Internal Medicine  Jan 30, 2025          Hunter Villa is a 59 year old, presenting for the following:    Overall he is doing quite well and has done fabulous in terms of weight loss with a GLP-1 agent.  We back down to 7.5 and his weights been stable.  We discussed it and I will go back down further to 5 mg.  If his weight goes up he will let me know    He has presumed cardiovascular disease.  He works out regularly and his blood pressure is very good.  He has no cardiovascular symptoms.  He is on the GLP-1.    He has hypertension.  At home his blood pressure is good.  It is not too low.  In the summer out in the heat he was getting lightheaded but has not had that.    He has a history of seizures, no recent issues.    He has a lump in the left forehead area for some time.    He is having urinary symptoms.  Slow urinary stream and sometimes hard to go at night.  Nocturia 1-2.  No burning or blood.    He has a history of elevated creatinine and sugar               Past Medical History:      Past Medical History:   Diagnosis Date    ASCVD (arteriosclerotic cardiovascular disease) 2018    seen on cta 2018, rca and lad; fu est 1/2023 nl, fu est echo 5/2024 nl    AVM (arteriovenous malformation) brain 2005    surgery [facility]    Elevated blood sugar     Elevated serum creatinine     Flank pain 04/2017    ct abd and pelvis nl    H/O colonoscopy 04/2016    int hem otherwise nl    Hearing loss in left ear     HTN (hypertension) 2011    added norvasc 6/16    Male erectile disorder 2019    Morbid obesity (H)     added glp1 Jan 2024    Nephrolithiasis 2005 and 2006    Dr. Chaparro    Normal echocardiogram 2006    Normal stress echocardiogram 2006, 2015, 8/17,11/19    nl also 1/2023    Numbness 2006    mri neg    Palpitations 2011    est echo nl [place] [facility] 8/31/11    Screening 2011, 2018    ebct score 218 in 2018, seen by Dr. Jimenez 2011; cta done  8/18 with some cad and higher calcium score    Seizure (H) 2006    right sided numbness, Dr. Rosales    Tinnitus of left ear 2005             Past Surgical History:      Past Surgical History:   Procedure Laterality Date    LITHOTRIPSY  2005    [facility] BRAIN AVM SURG,DURAL,COMPLX  2005    [place]             Social History:     Social History     Socioeconomic History    Marital status:      Spouse name: Not on file    Number of children: 3    Years of education: Not on file    Highest education level: Not on file   Occupational History    Occupation: marketing and sales   Tobacco Use    Smoking status: Never    Smokeless tobacco: Never   Vaping Use    Vaping status: Never Used   Substance and Sexual Activity    Alcohol use: Yes     Alcohol/week: 0.0 standard drinks of alcohol     Comment: 2 drinks monthly    Drug use: No    Sexual activity: Yes     Partners: Female   Other Topics Concern    Parent/sibling w/ CABG, MI or angioplasty before 65F 55M? Not Asked   Social History Narrative    Not on file     Social Drivers of Health     Financial Resource Strain: Low Risk  (1/30/2025)    Financial Resource Strain     Within the past 12 months, have you or your family members you live with been unable to get utilities (heat, electricity) when it was really needed?: No   Food Insecurity: Low Risk  (1/30/2025)    Food Insecurity     Within the past 12 months, did you worry that your food would run out before you got money to buy more?: No     Within the past 12 months, did the food you bought just not last and you didn t have money to get more?: No   Transportation Needs: Low Risk  (1/30/2025)    Transportation Needs     Within the past 12 months, has lack of transportation kept you from medical appointments, getting your medicines, non-medical meetings or appointments, work, or from getting things that you need?: No   Physical Activity: Unknown (1/30/2025)    Exercise Vital Sign     Days of Exercise per Week: 7 days      Minutes of Exercise per Session: Not on file   Stress: Stress Concern Present (1/30/2025)    [employer] - Occupational Stress Questionnaire     Feeling of Stress : To some extent   Social Connections: Unknown (1/30/2025)    Social Connection and Isolation Panel [NHANES]     Frequency of Communication with Friends and Family: Not on file     Frequency of Social Gatherings with Friends and Family: Once a week     Attends Yarsanism Services: Not on file     Active Member of Clubs or Organizations: Not on file     Attends Club or Organization Meetings: Not on file     Marital Status: Not on file   Interpersonal Safety: Low Risk  (1/30/2025)    Interpersonal Safety     Do you feel physically and emotionally safe where you currently live?: Yes     Within the past 12 months, have you been hit, slapped, kicked or otherwise physically hurt by someone?: No     Within the past 12 months, have you been humiliated or emotionally abused in other ways by your partner or ex-partner?: No   Housing Stability: Low Risk  (1/30/2025)    Housing Stability     Do you have housing? : Yes     Are you worried about losing your housing?: No             Family History:   reviewed         Allergies:   No Known Allergies          Medications:     Current Outpatient Medications   Medication Sig Dispense Refill    amLODIPine (NORVASC) 5 MG tablet TAKE 1 TABLET BY MOUTH TWICE A  tablet 3    Ascorbic Acid (MARCOS-C PO) Take 1,000 mg by mouth      aspirin 81 MG tablet Take 1 tablet by mouth daily.      atorvastatin (LIPITOR) 40 MG tablet Take 1 tablet (40 mg) by mouth daily. 90 tablet 3    Cholecalciferol (VITAMIN D3 PO) Take 100 mcg by mouth daily      Coenzyme Q10 (COQ10) 200 MG CAPS       hydrochlorothiazide (HYDRODIURIL) 25 MG tablet Take 1 tablet (25 mg) by mouth daily. 90 tablet 3    levETIRAcetam (KEPPRA) 500 MG tablet TAKE 3 TABLETS BY MOUTH TWICE DAILY 540 tablet 3    loratadine 10 MG capsule Take 10 mg by  "mouth daily.      Omega-3 Fatty Acids (FISH OIL) 1200 MG capsule Take 1,200 mg by mouth daily      Probiotic Product (PRO-BIOTIC BLEND PO)       Pyridoxine HCl (VITAMIN B6 PO) Take 100 mg by mouth daily      tirzepatide-Weight Management (ZEPBOUND) 5 MG/0.5ML prefilled pen Inject 0.5 mLs (5 mg) subcutaneously every 7 days. 2 mL 2    tirzepatide-Weight Management (ZEPBOUND) 7.5 MG/0.5ML prefilled pen Inject 0.5 mLs (7.5 mg) subcutaneously every 7 days. 2 mL 4    UNABLE TO FIND MEDICATION NAME: triple flex                 Review of Systems:     The 10 point Review of Systems is negative other than noted in the HPI           Physical Exam:   Blood pressure 131/77, pulse 58, temperature 97.3  F (36.3  C), temperature source Temporal, resp. rate 16, height 1.626 m (5' 4.02\"), weight 67.1 kg (148 lb), SpO2 99%.    Exam:  Constitutional: healthy appearing, alert and in no distress  Heent: Normocephalic. Head without obvious masses or lesions. PERRLDC, EOMI. Mouth exam within normal limits: tongue, mucous membranes, posterior pharynx all normal, no lesions or abnormalities seen.  Tm's and canals within normal limits bilaterally. Neck supple, no nuchal rigidity or masses. No supraclavicular, or cervical adenopathy. Thyroid symmetric, no masses.  Cardiovascular: Regular rate and rhythm, no murmer, rub or gallops.  JVP not elevated, no edema.  Carotids within normal limits bilaterally, no bruits.  Respiratory: Normal respiratory effort.  Lungs clear, normal flow, no wheezing or crackles.  Breasts: Normal bilaterally.  No masses or lesions.  Nipples within normal limites.  No axillary lesions or nodes.  Gastrointestinal: Normal active bowel sounds.   Soft, not tender, no masses, guarding or rebound.  No hepatosplenomegaly.   Genitourinary: Rectal mod benign prostatic hypertrophy, smooth  Musculoskeletal: extremities normal, no gross deformities noted.  On his left mid forehead area there is a faintly raised bump that is not " tender or red  Skin: no suspicious lesions or rashes   Neurologic: Mental status within normal limits.  Speech fluent.  No gross motor abnormalities and gait intact.  Psychiatric: mentation appears normal and affect normal.         Data:   Labs sent, xray to be done        Assessment:    Normal complete physical exam  ASCVD, stable, med management  Obesity, he has done amazing with GLP-1 agent.  He is on a good weight and will back down to 5 mg.  If his weight goes up we can always raise it.  I do not think we should stop it given his presumed coronary disease  Seizure, no issues  Hypertension, controlled  Hyperlipidemia, on statin therapy  Lightheaded, resolved, suspect heat and blood pressure meds  Lump on his forehead, suspect scar, check x-ray  Urinary symptoms, BPH, I will refer to urology to make sure he is not retaining  Elevated creatinine, labs today  Hyperglycemia, labs today  Healthcare maintenance         Plan:   He is up-to-date on colon exam and immunizations  Continue exercise and diet  Check skull x-ray  Change GLP-1 to 5 mg  Letter with labs  Urology referral      Hossein Farah M.D.

## 2025-01-31 ENCOUNTER — TELEPHONE (OUTPATIENT)
Dept: FAMILY MEDICINE | Facility: CLINIC | Age: 60
End: 2025-01-31

## 2025-01-31 NOTE — TELEPHONE ENCOUNTER
[facility] Prior Authorization Team   Phone: [phone]    PA TEAM RECEIVED A DENIAL LETTER FOR PATIENT'S ZEPBOUND

## 2025-02-03 ENCOUNTER — TELEPHONE (OUTPATIENT)
Dept: FAMILY MEDICINE | Facility: CLINIC | Age: 60
End: 2025-02-03

## 2025-02-03 NOTE — TELEPHONE ENCOUNTER
I called and discussed the labs as well as x-ray with the patient.  Labs look great.  With respect to the x-ray they did not see anything.  I explained to the patient that CT would be a better modality if we need to do this.  If it starts to grow or hurt he will let me know and then I would order a CT.    Hossein Farah M.D.

## 2025-02-18 ENCOUNTER — VIRTUAL VISIT (OUTPATIENT)
Dept: UROLOGY | Facility: CLINIC | Age: 60
End: 2025-02-18
Attending: NURSE PRACTITIONER
Payer: COMMERCIAL

## 2025-02-18 DIAGNOSIS — R35.1 NOCTURIA: ICD-10-CM

## 2025-02-18 DIAGNOSIS — R39.9 LOWER URINARY TRACT SYMPTOMS (LUTS): Primary | ICD-10-CM

## 2025-02-18 PROCEDURE — 98001 SYNCH AUDIO-VIDEO NEW LOW 30: CPT | Performed by: NURSE PRACTITIONER

## 2025-02-18 RX ORDER — ALFUZOSIN HYDROCHLORIDE 10 MG/1
10 TABLET, EXTENDED RELEASE ORAL DAILY
Qty: 30 TABLET | Refills: 3 | Status: SHIPPED | OUTPATIENT
Start: 2025-02-18

## 2025-02-18 NOTE — PROGRESS NOTES
UROLOGY VIDEO OFFICE VISIT     Video-Visit Details    Type of service:  Video Visit    Video Start Time (time video started): 1100    Video End Time (time video stopped): 1130    Originating Location (pt. Location): Home    Distant Location (provider location):  Off-site    Mode of Communication:  Video Conference via [employer]            Assessment and Plan:     Assessment: 59 year old male with LUTS    Plan:  -Reviewed concerns of BPH with LUTS. Discussed the diagnosis and natural history of benign prostatic hyperplasia and lower urinary tract symptoms. We discussed treatment options including observation vs. medical therapy. He would like to start with medical therapy.   -Will start on alfuzosin 10mg PO daily. Discussed r/b/a of medication.   -Recommend nurse visit for PVR to ensure complete bladder emptying. Pt amenable to plan.  -RTC in 3 months for re-evaluation.      Lenore Gastelum CNP  Department of Urology   February 18, 2025    I spent a total of 30 minutes spent on the date of the encounter doing chart review, history and exam, documentation, and further activities as noted above.          Chief Complaint:   LUTS         History of Present Illness:    Allen Bills is a pleasant 59 year old male who presents for evaluation of slow urinary stream and nocturia.    Mr. Bills notes history of nocturia and LUTS. However over the last month he has noticed an occasional intermittent stream with difficulty starting urinary stream which prompt this visit.     Symptoms are dependent on fluid intake.     He does travel for work. He notes on travel days via car will slightly restrict fluids. He notes urinary urgency once able to void at rest stop.     If at home will void about every 2-3 hours. Will void about 1-2x/night.     Obstructive urinary symptoms include decreased force of stream, hesitancy (at night only), an occasional intermittent stream, post-void dribbling, double voiding, feelings of  incomplete bladder emptying, and prolonged micturition.     Irritative symptoms include urinary urgency. No concerns of UUI.     Fluid intake consists 32oz protein shake, 16oz decaf coffee, and then water ~60oz. Does avoid bladder irritants.     Will occasional have issues with constipation due to Zepbound.     History of nephrolithiasis about 14 years ago. He was placed on hydrochlorothiazide 25mg at that time.     Denies any recent episode gross hematuria or history of UTI    The patient denies a family history of bladder, kidney, or prostate malignancies.           Past Medical History:     Past Medical History:   Diagnosis Date    ASCVD (arteriosclerotic cardiovascular disease) 2018    seen on cta 2018, rca and lad; fu est 1/2023 nl, fu est echo 5/2024 nl    AVM (arteriovenous malformation) brain 2005    surgery [place]    Elevated blood sugar     Elevated serum creatinine     Flank pain 04/2017    ct abd and pelvis nl    H/O colonoscopy 04/2016    int hem otherwise nl    Hearing loss in left ear     HTN (hypertension) 2011    added norvasc 6/16    Male erectile disorder 2019    Morbid obesity (H)     added glp1 Jan 2024    Nephrolithiasis 2005 and 2006    Dr. Chaparro    Normal echocardiogram 2006    Normal stress echocardiogram 2006, 2015, 8/17,11/19    nl also 1/2023    Numbness 2006    mri neg    Palpitations 2011    est echo nl [place] [facility] 8/31/11    Screening 2011, 2018    ebct score 218 in 2018, seen by Dr. Jimenez 2011; cta done 8/18 with some cad and higher calcium score    Seizure (H) 2006    right sided numbness, Dr. Rosales    Tinnitus of left ear 2005            Past Surgical History:     Past Surgical History:   Procedure Laterality Date    LITHOTRIPSY  2005    [facility] BRAIN AVM SURG,DURAL,COMPLX  2005    [place]            Medications     Current Outpatient Medications   Medication Sig Dispense Refill    amLODIPine (NORVASC) 5 MG tablet TAKE 1 TABLET BY MOUTH TWICE A  tablet 3    Ascorbic Acid  (MARCOS-C PO) Take 1,000 mg by mouth      aspirin 81 MG tablet Take 1 tablet by mouth daily.      atorvastatin (LIPITOR) 40 MG tablet Take 1 tablet (40 mg) by mouth daily. 90 tablet 3    Cholecalciferol (VITAMIN D3 PO) Take 100 mcg by mouth daily      Coenzyme Q10 (COQ10) 200 MG CAPS       hydrochlorothiazide (HYDRODIURIL) 25 MG tablet Take 1 tablet (25 mg) by mouth daily. 90 tablet 3    levETIRAcetam (KEPPRA) 500 MG tablet TAKE 3 TABLETS BY MOUTH TWICE DAILY 540 tablet 3    loratadine 10 MG capsule Take 10 mg by mouth daily.      Omega-3 Fatty Acids (FISH OIL) 1200 MG capsule Take 1,200 mg by mouth daily      Probiotic Product (PRO-BIOTIC BLEND PO)       Pyridoxine HCl (VITAMIN B6 PO) Take 100 mg by mouth daily      tirzepatide-Weight Management (ZEPBOUND) 5 MG/0.5ML prefilled pen Inject 0.5 mLs (5 mg) subcutaneously every 7 days. 2 mL 2    tirzepatide-Weight Management (ZEPBOUND) 7.5 MG/0.5ML prefilled pen Inject 0.5 mLs (7.5 mg) subcutaneously every 7 days. 2 mL 4    UNABLE TO FIND MEDICATION NAME: triple flex       No current facility-administered medications for this visit.            Allergies:   Patient has no known allergies.         Review of Systems:  From intake questionnaire   Negative 14 system review except as noted on HPI, nurse's note.         Physical Exam:     General Appearance: Well groomed, hygenic  Eyes: No redness, discharge  Respiratory: No cough, no respiratory distress or labored breathing  Musculoskeletal: Grossly normal, full range of motion in upper extremities, no gross deficits  Skin: No discoloration or apparent rashes  Neurologic - No tremors  Psychiatric - Alert and oriented  The rest of a comprehensive physical examination is deferred due to video visit restrictions        Labs and Pathology:    I personally reviewed all applicable laboratory data and went over findings with patient  Significant for:    CBC RESULTS:  Recent Labs   Lab Test 01/30/25  1237 01/25/24  1203 10/18/22  0836  08/24/21  0745   WBC 5.5 6.7 6.7 6.6   HGB 14.5 14.9 14.6 14.8    286 275 254        BMP RESULTS:  Recent Labs   Lab Test 01/30/25  1237 05/28/24  1043 01/25/24  1203 10/18/22  0836 08/24/21  0745 10/29/19  0848 07/09/18  1008 06/25/18  1404 08/15/17  1600    140 138 140   < > 138  --  139 141   POTASSIUM 4.3 3.8 3.8 3.5   < > 3.8   < > 3.3* 3.4   CHLORIDE 103 103 99 106   < > 103  --  102 105   CO2 27 27 27 28   < > 28  --  25 27   ANIONGAP 10 10 12 6   < > 7  --  12 9   GLC 91 107* 96 102*   < > 102*  --  79 95   BUN 22.2 13.1 16.1 18   < > 25  --  22 20   CR 1.08 1.25* 1.45* 1.14   < > 1.23  --  1.20 1.35*   GFRESTIMATED 78 66 55* 74   < > 65  --  63 55*   GFRESTBLACK  --   --   --   --   --  75  --  76 67   GLADYS 9.8 9.8 9.7 9.0   < > 8.8  --  9.2 9.1    < > = values in this interval not displayed.       UA RESULTS:   Recent Labs   Lab Test 01/30/25  1237 10/18/22  0836 04/30/20  1345 10/29/19  0848 08/15/17  1601   SG 1.015 1.020 1.015 1.025 1.020   URINEPH 6.0 6.5 7.0 7.0 6.0   NITRITE Negative Negative Negative Negative Negative   RBCU  --  0-2  --  O - 2 O - 2   WBCU  --  None Seen  --  0 - 5 O - 2       PSA RESULTS  PSA   Date Value Ref Range Status   10/29/2019 0.91 0 - 4 ug/L Final     Comment:     Assay Method:  Chemiluminescence using Siemens Vista analyzer   08/15/2017 1.04 0 - 4 ug/L Final     Comment:     Assay Method:  Chemiluminescence using Siemens Vista analyzer   06/06/2016 1.13 0 - 4 ug/L Final     Prostate Specific Antigen Screen   Date Value Ref Range Status   01/30/2025 1.06 0.00 - 4.50 ng/mL Final   01/25/2024 1.08 0.00 - 4.50 ng/mL Final   08/24/2021 0.91 0.00 - 4.00 ug/L Final

## 2025-02-18 NOTE — NURSING NOTE
Current patient location: [place]    Is the patient currently in the state of [place]? YES    Visit mode: VIDEO    If the visit is dropped, the patient can be reconnected by:VIDEO VISIT: Send to e-mail at: [email or web address]    Will anyone else be joining the visit? NO  (If patient encounters technical issues they should call [phone])    Are changes needed to the allergy or medication list? Patient completed e-check in  for today's appointment. VF did not review e-check in information again with patient due to this.    Are refills needed on medications prescribed by this physician? Discuss with provider    Rooming Documentation:  Not applicable    Reason for visit: Consult (New Patient- urinary issues.)    Phuong VILLATORO

## 2025-02-18 NOTE — PATIENT INSTRUCTIONS
UROLOGY CLINIC VISIT PATIENT INSTRUCTIONS      If you have any issues, questions or concerns in the meantime, do not hesitate to contact us at [facility] at [phone] or via [employer].     Lenore Gastelum CNP  Department of Urology

## 2025-05-19 DIAGNOSIS — E66.01 MORBID OBESITY (H): ICD-10-CM

## 2025-05-19 DIAGNOSIS — I10 BENIGN ESSENTIAL HYPERTENSION: ICD-10-CM

## 2025-05-19 DIAGNOSIS — E78.5 HYPERLIPIDEMIA LDL GOAL <100: ICD-10-CM

## 2025-05-19 RX ORDER — TIRZEPATIDE 5 MG/.5ML
INJECTION, SOLUTION SUBCUTANEOUS
Qty: 4 ML | Refills: 0 | Status: SHIPPED | OUTPATIENT
Start: 2025-05-19

## 2025-06-21 DIAGNOSIS — R35.1 NOCTURIA: ICD-10-CM

## 2025-06-21 DIAGNOSIS — R39.9 LOWER URINARY TRACT SYMPTOMS (LUTS): ICD-10-CM

## 2025-06-23 RX ORDER — ALFUZOSIN HYDROCHLORIDE 10 MG/1
1 TABLET, EXTENDED RELEASE ORAL DAILY
Qty: 30 TABLET | Refills: 0 | Status: SHIPPED | OUTPATIENT
Start: 2025-06-23

## 2025-07-05 ENCOUNTER — TRANSFERRED RECORDS (OUTPATIENT)
Dept: HEALTH INFORMATION MANAGEMENT | Facility: CLINIC | Age: 60
End: 2025-07-05
Payer: COMMERCIAL

## 2025-07-09 ENCOUNTER — VIRTUAL VISIT (OUTPATIENT)
Dept: UROLOGY | Facility: CLINIC | Age: 60
End: 2025-07-09
Payer: COMMERCIAL

## 2025-07-09 VITALS — HEIGHT: 65 IN | WEIGHT: 150 LBS | BODY MASS INDEX: 24.99 KG/M2

## 2025-07-09 DIAGNOSIS — R35.1 NOCTURIA: ICD-10-CM

## 2025-07-09 DIAGNOSIS — R35.1 BENIGN PROSTATIC HYPERPLASIA WITH NOCTURIA: Primary | ICD-10-CM

## 2025-07-09 DIAGNOSIS — N40.1 BENIGN PROSTATIC HYPERPLASIA WITH NOCTURIA: Primary | ICD-10-CM

## 2025-07-09 RX ORDER — ALFUZOSIN HYDROCHLORIDE 10 MG/1
1 TABLET, EXTENDED RELEASE ORAL DAILY
Qty: 90 TABLET | Refills: 4 | Status: SHIPPED | OUTPATIENT
Start: 2025-07-09

## 2025-07-09 ASSESSMENT — PAIN SCALES - GENERAL: PAINLEVEL_OUTOF10: MILD PAIN (3)

## 2025-07-09 NOTE — PROGRESS NOTES
UROLOGY VIDEO OFFICE VISIT     Video-Visit Details    Type of service:  Video Visit    Video Start Time (time video started): 1105    Video End Time (time video stopped): 1117    Originating Location (pt. Location): Home    Distant Location (provider location):  Off-site    Mode of Communication:  Video Conference via [employer]          Assessment and Plan:     Assessment: 59 year old male with BPH with LUTS.     Plan:  -Recommend to continue with alfuzosin 10mg daily for BPH with LUTS. We discussed if having increasing bothersome symptoms to please reach out to Urology clinic.   -RTC in 1 year or sooner GABRIELA Gastelum CNP  Department of Urology   July 9, 2025    I spent a total of 20 minutes spent on the date of the encounter doing chart review, history and exam, documentation, and further activities as noted above.          Chief Complaint:   BPH with LUTS         History of Present Illness:    Allen Bills is a pleasant 59 year old male who presents for follow up of BPH with LUTS.     Mr. Bills was last seen with Urology on 2/18/25. He was started on alfuzosin 10mg daily at that time.     He notes improvement in LUTS. He states a decrease in urinary urgency, urinary hesitancy at night, and post-void dribbling. Will void about 1x/night.      Denies any concerns with use of alfuzosin at this time.     Denies any feelings of incomplete bladder emptying at this time.     Since Jan of 2025, he has had symptoms of an intermittent stream with difficulty starting urinary stream. Previously was having obstructive urinary symptoms include decreased force of stream, hesitancy (at night only), an occasional intermittent stream, post-void dribbling, double voiding, feelings of incomplete bladder emptying, and prolonged micturition. Irritative symptoms include urinary urgency. No concerns of UUI.      He does travel for work. He notes on travel days via car will slightly restrict fluids. He notes urinary  urgency once able to void at rest stop.      Fluid intake consists 32oz protein shake, 16oz decaf coffee, and then water ~60oz. Does avoid bladder irritants. He notes a slight decrease in his fluid intake.      Will occasional have issues with constipation due to Zepbound.      History of nephrolithiasis about 14 years ago. He was placed on hydrochlorothiazide 25mg at that time.      Denies any recent episode gross hematuria or history of UTI     The patient denies a family history of bladder, kidney, or prostate malignancies.          Past Medical History:     Past Medical History:   Diagnosis Date    ASCVD (arteriosclerotic cardiovascular disease) 2018    seen on cta 2018, rca and lad; fu est 1/2023 nl, fu est echo 5/2024 nl    AVM (arteriovenous malformation) brain 2005    surgery [facility]    Elevated blood sugar     Elevated serum creatinine     Flank pain 04/2017    ct abd and pelvis nl    H/O colonoscopy 04/2016    int hem otherwise nl    Hearing loss in left ear     HTN (hypertension) 2011    added norvasc 6/16    Male erectile disorder 2019    Morbid obesity (H)     added glp1 Jan 2024    Nephrolithiasis 2005 and 2006    Dr. [facility]    Normal echocardiogram 2006    Normal stress echocardiogram 2006, 2015, 8/17,11/19    nl also 1/2023    Numbness 2006    mri neg    Palpitations 2011    est echo nl [place] [facility] 8/31/11    Screening 2011, 2018    ebct score 218 in 2018, seen by Dr. Jimenez 2011; cta done 8/18 with some cad and higher calcium score    Seizure (H) 2006    right sided numbness, Dr. Rosales    Tinnitus of left ear 2005            Past Surgical History:     Past Surgical History:   Procedure Laterality Date    LITHOTRIPSY  2005    [facility] BRAIN AVM SURG,DURAL,COMPLX  2005    [facility]            Medications     Current Outpatient Medications   Medication Sig Dispense Refill    alfuzosin ER (UROXATRAL) 10 MG 24 hr tablet Take 1 tablet by mouth once daily 30 tablet 0    amLODIPine (NORVASC) 5 MG tablet TAKE 1  TABLET BY MOUTH TWICE A  tablet 3    Ascorbic Acid (MARCOS-C PO) Take 1,000 mg by mouth      aspirin 81 MG tablet Take 1 tablet by mouth daily.      atorvastatin (LIPITOR) 40 MG tablet Take 1 tablet (40 mg) by mouth daily. 90 tablet 3    Cholecalciferol (VITAMIN D3 PO) Take 100 mcg by mouth daily      Coenzyme Q10 (COQ10) 200 MG CAPS       hydrochlorothiazide (HYDRODIURIL) 25 MG tablet Take 1 tablet (25 mg) by mouth daily. 90 tablet 3    levETIRAcetam (KEPPRA) 500 MG tablet TAKE 3 TABLETS BY MOUTH TWICE DAILY 540 tablet 3    loratadine 10 MG capsule Take 10 mg by mouth daily.      Omega-3 Fatty Acids (FISH OIL) 1200 MG capsule Take 1,200 mg by mouth daily      Probiotic Product (PRO-BIOTIC BLEND PO)       Pyridoxine HCl (VITAMIN B6 PO) Take 100 mg by mouth daily      tirzepatide-Weight Management (ZEPBOUND) 7.5 MG/0.5ML prefilled pen Inject 0.5 mLs (7.5 mg) subcutaneously every 7 days. 2 mL 4    UNABLE TO FIND MEDICATION NAME: triple flex      ZEPBOUND 5 MG/0.5ML prefilled pen INJECT 5 MG  SUBCUTANEOUSLY ONCE A WEEK 4 mL 0     No current facility-administered medications for this visit.            Allergies:   Seasonal allergies         Review of Systems:  From intake questionnaire   Negative 14 system review except as noted on HPI, nurse's note.         Physical Exam:     General Appearance: Well groomed, hygenic  Eyes: No redness, discharge  Respiratory: No cough, no respiratory distress or labored breathing  Musculoskeletal: Grossly normal, full range of motion in upper extremities, no gross deficits  Skin: No discoloration or apparent rashes  Neurologic - No tremors  Psychiatric - Alert and oriented  The rest of a comprehensive physical examination is deferred due to video visit restrictions      Labs and Pathology:    I personally reviewed all applicable laboratory data and went over findings with patient  Significant for:    CBC RESULTS:  Recent Labs   Lab Test 01/30/25  1237 01/25/24  1203 10/18/22  0836  08/24/21  0745   WBC 5.5 6.7 6.7 6.6   HGB 14.5 14.9 14.6 14.8    286 275 254        BMP RESULTS:  Recent Labs   Lab Test 01/30/25  1237 05/28/24  1043 01/25/24  1203 10/18/22  0836 08/24/21  0745 10/29/19  0848 07/09/18  1008 06/25/18  1404 08/15/17  1600    140 138 140   < > 138  --  139 141   POTASSIUM 4.3 3.8 3.8 3.5   < > 3.8   < > 3.3* 3.4   CHLORIDE 103 103 99 106   < > 103  --  102 105   CO2 27 27 27 28   < > 28  --  25 27   ANIONGAP 10 10 12 6   < > 7  --  12 9   GLC 91 107* 96 102*   < > 102*  --  79 95   BUN 22.2 13.1 16.1 18   < > 25  --  22 20   CR 1.08 1.25* 1.45* 1.14   < > 1.23  --  1.20 1.35*   GFRESTIMATED 78 66 55* 74   < > 65  --  63 55*   GFRESTBLACK  --   --   --   --   --  75  --  76 67   GLADYS 9.8 9.8 9.7 9.0   < > 8.8  --  9.2 9.1    < > = values in this interval not displayed.       UA RESULTS:   Recent Labs   Lab Test 01/30/25  1237 10/18/22  0836 04/30/20  1345 10/29/19  0848 08/15/17  1601   SG 1.015 1.020 1.015 1.025 1.020   URINEPH 6.0 6.5 7.0 7.0 6.0   NITRITE Negative Negative Negative Negative Negative   RBCU  --  0-2  --  O - 2 O - 2   WBCU  --  None Seen  --  0 - 5 O - 2       PSA RESULTS  PSA   Date Value Ref Range Status   10/29/2019 0.91 0 - 4 ug/L Final     Comment:     Assay Method:  Chemiluminescence using Siemens Vista analyzer   08/15/2017 1.04 0 - 4 ug/L Final     Comment:     Assay Method:  Chemiluminescence using Siemens Vista analyzer   06/06/2016 1.13 0 - 4 ug/L Final     Prostate Specific Antigen Screen   Date Value Ref Range Status   01/30/2025 1.06 0.00 - 4.50 ng/mL Final   01/25/2024 1.08 0.00 - 4.50 ng/mL Final   08/24/2021 0.91 0.00 - 4.00 ug/L Final

## 2025-07-09 NOTE — NURSING NOTE
Current patient location: Patient declined to provide     Is the patient currently in the state of [place]? YES    Visit mode: VIDEO    If the visit is dropped, the patient can be reconnected by:VIDEO VISIT: Text to cell phone:   Telephone Information:   Mobile [phone]       Will anyone else be joining the visit? NO  (If patient encounters technical issues they should call [phone]    Are changes needed to the allergy or medication list? No    Are refills needed on medications prescribed by this physician? NO    Rooming Documentation:  Not applicable    Reason for visit: RECHECK    Zuly VILLATORO

## 2025-07-09 NOTE — PATIENT INSTRUCTIONS
UROLOGY CLINIC VISIT PATIENT INSTRUCTIONS    It was a pleasure seeing you today! Thank you for giving us the opportunity to care for you. We hope we provided the excellent service you deserve and look forward to serving you again.    Instructions per today's visit: Return to clinic in 1 year for re-evaluation or sooner as needed.    If you have any issues, questions, or concerns, please don't hesitate to contact us at [facility] at [phone] or via [email or web address].    Important Contact Information:  -To each our nurse triage line, please call our contact center at [phone].  -Our clinic hours are Monday through Friday, 8:00 a.m. - 4:30 p.m. Feel free to call during these hours with any questions.  -You can also contact us anytime via [email or web address], and we will respond during clinic hours.      Lenore Gastelum CNP  Department of Urology

## 2025-07-16 DIAGNOSIS — E66.01 MORBID OBESITY (H): ICD-10-CM

## 2025-07-16 DIAGNOSIS — E78.5 HYPERLIPIDEMIA LDL GOAL <100: ICD-10-CM

## 2025-07-16 DIAGNOSIS — I10 BENIGN ESSENTIAL HYPERTENSION: ICD-10-CM

## 2025-07-16 RX ORDER — TIRZEPATIDE 5 MG/.5ML
INJECTION, SOLUTION SUBCUTANEOUS
Qty: 4 ML | Refills: 0 | Status: SHIPPED | OUTPATIENT
Start: 2025-07-16

## (undated) RX ORDER — NITROGLYCERIN 0.4 MG/1
TABLET SUBLINGUAL
Status: DISPENSED
Start: 2018-08-17

## (undated) RX ORDER — METOPROLOL TARTRATE 1 MG/ML
INJECTION, SOLUTION INTRAVENOUS
Status: DISPENSED
Start: 2018-08-17